# Patient Record
Sex: FEMALE | Race: WHITE | NOT HISPANIC OR LATINO | Employment: OTHER | URBAN - METROPOLITAN AREA
[De-identification: names, ages, dates, MRNs, and addresses within clinical notes are randomized per-mention and may not be internally consistent; named-entity substitution may affect disease eponyms.]

---

## 2017-09-02 ENCOUNTER — RADIANT APPOINTMENT (OUTPATIENT)
Dept: MAMMOGRAPHY | Facility: CLINIC | Age: 52
End: 2017-09-02
Payer: COMMERCIAL

## 2017-09-02 DIAGNOSIS — Z12.31 VISIT FOR SCREENING MAMMOGRAM: ICD-10-CM

## 2017-09-02 PROCEDURE — G0202 SCR MAMMO BI INCL CAD: HCPCS | Mod: TC

## 2017-09-14 ENCOUNTER — RADIANT APPOINTMENT (OUTPATIENT)
Dept: MAMMOGRAPHY | Facility: CLINIC | Age: 52
End: 2017-09-14
Attending: FAMILY MEDICINE
Payer: COMMERCIAL

## 2017-09-14 ENCOUNTER — RADIANT APPOINTMENT (OUTPATIENT)
Dept: ULTRASOUND IMAGING | Facility: CLINIC | Age: 52
End: 2017-09-14
Attending: FAMILY MEDICINE
Payer: COMMERCIAL

## 2017-09-14 DIAGNOSIS — R92.8 ABNORMAL MAMMOGRAM: ICD-10-CM

## 2017-09-14 PROCEDURE — 76642 ULTRASOUND BREAST LIMITED: CPT | Mod: LT | Performed by: STUDENT IN AN ORGANIZED HEALTH CARE EDUCATION/TRAINING PROGRAM

## 2017-09-14 PROCEDURE — G0206 DX MAMMO INCL CAD UNI: HCPCS | Mod: LT | Performed by: STUDENT IN AN ORGANIZED HEALTH CARE EDUCATION/TRAINING PROGRAM

## 2017-09-18 ENCOUNTER — OFFICE VISIT (OUTPATIENT)
Dept: FAMILY MEDICINE | Facility: CLINIC | Age: 52
End: 2017-09-18
Payer: COMMERCIAL

## 2017-09-18 VITALS
HEIGHT: 62 IN | BODY MASS INDEX: 28.34 KG/M2 | HEART RATE: 76 BPM | OXYGEN SATURATION: 99 % | TEMPERATURE: 97.8 F | WEIGHT: 154 LBS | DIASTOLIC BLOOD PRESSURE: 78 MMHG | SYSTOLIC BLOOD PRESSURE: 124 MMHG

## 2017-09-18 DIAGNOSIS — Z12.11 SCREEN FOR COLON CANCER: ICD-10-CM

## 2017-09-18 DIAGNOSIS — M19.041 PRIMARY OSTEOARTHRITIS OF BOTH HANDS: ICD-10-CM

## 2017-09-18 DIAGNOSIS — Z72.0 TOBACCO ABUSE: ICD-10-CM

## 2017-09-18 DIAGNOSIS — Z23 NEED FOR PROPHYLACTIC VACCINATION AND INOCULATION AGAINST INFLUENZA: ICD-10-CM

## 2017-09-18 DIAGNOSIS — N95.1 MENOPAUSAL SYNDROME (HOT FLASHES): ICD-10-CM

## 2017-09-18 DIAGNOSIS — B00.1 HERPES SIMPLEX LABIALIS: ICD-10-CM

## 2017-09-18 DIAGNOSIS — M19.042 PRIMARY OSTEOARTHRITIS OF BOTH HANDS: ICD-10-CM

## 2017-09-18 DIAGNOSIS — Z00.00 ROUTINE GENERAL MEDICAL EXAMINATION AT A HEALTH CARE FACILITY: Primary | ICD-10-CM

## 2017-09-18 LAB
CHOLEST SERPL-MCNC: 282 MG/DL
GLUCOSE SERPL-MCNC: 97 MG/DL (ref 70–99)
HDLC SERPL-MCNC: 37 MG/DL
LDLC SERPL CALC-MCNC: ABNORMAL MG/DL
LDLC SERPL DIRECT ASSAY-MCNC: 181 MG/DL
NONHDLC SERPL-MCNC: 245 MG/DL
TRIGL SERPL-MCNC: 446 MG/DL

## 2017-09-18 PROCEDURE — 83721 ASSAY OF BLOOD LIPOPROTEIN: CPT | Performed by: FAMILY MEDICINE

## 2017-09-18 PROCEDURE — 90471 IMMUNIZATION ADMIN: CPT | Performed by: FAMILY MEDICINE

## 2017-09-18 PROCEDURE — 36415 COLL VENOUS BLD VENIPUNCTURE: CPT | Performed by: FAMILY MEDICINE

## 2017-09-18 PROCEDURE — 80061 LIPID PANEL: CPT | Performed by: FAMILY MEDICINE

## 2017-09-18 PROCEDURE — 82947 ASSAY GLUCOSE BLOOD QUANT: CPT | Performed by: FAMILY MEDICINE

## 2017-09-18 PROCEDURE — 90686 IIV4 VACC NO PRSV 0.5 ML IM: CPT | Performed by: FAMILY MEDICINE

## 2017-09-18 PROCEDURE — 87624 HPV HI-RISK TYP POOLED RSLT: CPT | Performed by: FAMILY MEDICINE

## 2017-09-18 PROCEDURE — G0145 SCR C/V CYTO,THINLAYER,RESCR: HCPCS | Performed by: FAMILY MEDICINE

## 2017-09-18 PROCEDURE — 99386 PREV VISIT NEW AGE 40-64: CPT | Mod: 25 | Performed by: FAMILY MEDICINE

## 2017-09-18 RX ORDER — VALACYCLOVIR HYDROCHLORIDE 1 G/1
2000 TABLET, FILM COATED ORAL 2 TIMES DAILY
Qty: 8 TABLET | Refills: 3 | Status: SHIPPED | OUTPATIENT
Start: 2017-09-18 | End: 2018-11-19

## 2017-09-18 NOTE — MR AVS SNAPSHOT
After Visit Summary   9/18/2017    Angela Mendoza    MRN: [id number]           Patient Information     Date Of Birth          1965        Visit Information        Provider Department      9/18/2017 7:40 AM Cathie Smith MD [facility]        Today's Diagnoses     Routine general medical examination at a health care facility    -  1    Herpes simplex labialis        Tobacco abuse        Menopausal syndrome (hot flashes)        Primary osteoarthritis of both hands          Care Instructions      Options for menopausal symptom control:  Over-the-counter options include black cohosh, evening primrose or estroven  Prescription options include serotonin re-uptake inhibitor medications such as citalopram or zoloft, also clonidine (blood pressure medication) can help.  Hormone replacement (estrogen and progesterone).      Astroglide lubricant    Preventive Health Recommendations  Female Ages 50 - 64    Yearly exam: See your health care provider every year in order to  o Review health changes.   o Discuss preventive care.    o Review your medicines if your doctor has prescribed any.      Get a Pap test every three years (unless you have an abnormal result and your provider advises testing more often).    If you get Pap tests with HPV test, you only need to test every 5 years, unless you have an abnormal result.     You do not need a Pap test if your uterus was removed (hysterectomy) and you have not had cancer.    You should be tested each year for STDs (sexually transmitted diseases) if you're at risk.     Have a mammogram every 1 to 2 years.    Have a colonoscopy at age 50, or have a yearly FIT test (stool test). These exams screen for colon cancer.      Have a cholesterol test every 5 years, or more often if advised.    Have a diabetes test (fasting glucose) every three years. If you are at risk for diabetes, you should have this test more often.     If you are at risk for  osteoporosis (brittle bone disease), think about having a bone density scan (DEXA).    Shots: Get a flu shot each year. Get a tetanus shot every 10 years.    Nutrition:     Eat at least 5 servings of fruits and vegetables each day.    Eat whole-grain bread, whole-wheat pasta and brown rice instead of white grains and rice.    Talk to your provider about Calcium and Vitamin D.     Lifestyle    Exercise at least 150 minutes a week (30 minutes a day, 5 days a week). This will help you control your weight and prevent disease.    Limit alcohol to one drink per day.    No smoking.     Wear sunscreen to prevent skin cancer.     See your dentist every six months for an exam and cleaning.    See your eye doctor every 1 to 2 years.    Preventive Health Recommendations  Female Ages 50 - 64    Yearly exam: See your health care provider every year in order to  o Review health changes.   o Discuss preventive care.    o Review your medicines if your doctor has prescribed any.      Get a Pap test every three years (unless you have an abnormal result and your provider advises testing more often).    If you get Pap tests with HPV test, you only need to test every 5 years, unless you have an abnormal result.     You do not need a Pap test if your uterus was removed (hysterectomy) and you have not had cancer.    You should be tested each year for STDs (sexually transmitted diseases) if you're at risk.     Have a mammogram every 1 to 2 years.    Have a colonoscopy at age 50, or have a yearly FIT test (stool test). These exams screen for colon cancer.      Have a cholesterol test every 5 years, or more often if advised.    Have a diabetes test (fasting glucose) every three years. If you are at risk for diabetes, you should have this test more often.     If you are at risk for osteoporosis (brittle bone disease), think about having a bone density scan (DEXA).    Shots: Get a flu shot each year. Get a tetanus shot every 10  years.    Nutrition:     Eat at least 5 servings of fruits and vegetables each day.    Eat whole-grain bread, whole-wheat pasta and brown rice instead of white grains and rice.    Talk to your provider about Calcium and Vitamin D.     Lifestyle    Exercise at least 150 minutes a week (30 minutes a day, 5 days a week). This will help you control your weight and prevent disease.    Limit alcohol to one drink per day.    No smoking.     Wear sunscreen to prevent skin cancer.     See your dentist every six months for an exam and cleaning.    See your eye doctor every 1 to 2 years.            Follow-ups after your visit        Who to contact     If you have questions or need follow up information about today's clinic visit or your schedule please contact [facility] directly at [phone].  Normal or non-critical lab and imaging results will be communicated to you by MyChart, letter or phone within 4 business days after the clinic has received the results. If you do not hear from us within 7 days, please contact the clinic through [employer] or phone. If you have a critical or abnormal lab result, we will notify you by phone as soon as possible.  Submit refill requests through [employer] or call your pharmacy and they will forward the refill request to us. Please allow 3 business days for your refill to be completed.          Additional Information About Your Visit        [employer] Information     [employer] gives you secure access to your electronic health record. If you see a primary care provider, you can also send messages to your care team and make appointments. If you have questions, please call your primary care clinic.  If you do not have a primary care provider, please call [phone] and they will assist you.        Care EveryWhere ID     This is your Care EveryWhere ID. This could be used by other organizations to access your [employer] medical records  [id number]        Your Vitals Were     Pulse  "Temperature Height Last Period Pulse Oximetry BMI (Body Mass Index)    76 97.8  F (36.6  C) (Oral) 5' 2\" (1.575 m) 01/16/2016 99% 28.17 kg/m2       Blood Pressure from Last 3 Encounters:   09/18/17 124/78   04/30/13 124/82   05/30/12 116/75    Weight from Last 3 Encounters:   09/18/17 154 lb (69.9 kg)   04/30/13 152 lb (68.9 kg)   05/23/12 144 lb (65.3 kg)              We Performed the Following     HPV High Risk Types DNA Cervical     Pap imaged thin layer screen with HPV - recommended age 30 - 65 years (select HPV order below)          Today's Medication Changes          These changes are accurate as of: 9/18/17  8:22 AM.  If you have any questions, ask your nurse or doctor.               Stop taking these medicines if you haven't already. Please contact your care team if you have questions.     fenofibrate 145 MG tablet   Stopped by:  Cathie Smith MD           MULTIVITAMIN PO   Stopped by:  Cathie Smith MD           NO ACTIVE MEDICATIONS   Stopped by:  Cathie Smith MD           NONFORMULARY   Stopped by:  Cathie Smith MD                    Primary Care Provider Office Phone # Fax #    [facility] [phone] [address] [facility]. [facility] [address]        Equal Access to Services     ZANDER NEGRETE AH: Hadii kodi ku hadasho Soomaali, waaxda luqadaha, qaybta kaalmada adeegyada, hannah posey. So [facility] [phone].    ATENCIÓN: Si habla español, tiene a alfaro disposición servicios gratuitos de asistencia lingüística. Cyrus al [phone].    We comply with applicable federal civil rights laws and [place] laws. We do not discriminate on the basis of race, color, national origin, age, disability sex, sexual orientation or gender identity.            Thank you!     Thank you for choosing [facility]  for your care. Our goal is always to provide you with excellent care. Hearing back from our patients is one way we can " continue to improve our services. Please take a few minutes to complete the written survey that you may receive in the mail after your visit with us. Thank you!             Your Updated Medication List - Protect others around you: Learn how to safely use, store and throw away your medicines at www.disposemymeds.org.          This list is accurate as of: 9/18/17  8:22 AM.  Always use your most recent med list.                   Brand Name Dispense Instructions for use Diagnosis    valACYclovir 1000 mg tablet    VALTREX    8 tablet    Take 2 tablets by mouth 2 times daily. 2 grams bid for 1 day    Herpes simplex labialis

## 2017-09-18 NOTE — PROGRESS NOTES
Injectable Influenza Immunization Documentation    1.  Is the person to be vaccinated sick today? NO    2. Does the person to be vaccinated have an allergy to a component   of the vaccine? NO    3. Has the person to be vaccinated ever had a serious reaction   to influenza vaccine in the past? NO  4. Has the person to be vaccinated ever had Guillain-Barré syndrome? No    Form completed by [facility], [facility]

## 2017-09-18 NOTE — LETTER
[facility]  [address] [facility] [address] [phone]        October 9, 2017    Bela Lovell  [address] [address]  [place] [address]            Dear Bela,    We should meet to review these labs within the next month.  Your fats are very high and that can cause other health problems.  Please be sure to watch the fatty fried foods and the butters/oils.     Ijeoma Mcgowan MD/khloe    Results for orders placed or performed in visit on 09/18/17   Pap imaged thin layer screen with HPV - recommended age 30 - 65 years (select HPV order below)   Result Value Ref Range    PAP NIL     Copath Report         Patient Name: BELA LOVELL  MR#: [id number]  Specimen #: [id number]  Collected: 9/18/2017  Received: 9/19/2017  Reported: 9/21/2017 09:07  Ordering Phy(s): IJEOMA MCGOWAN    For improved result formatting, select 'View Enhanced Report Format'  under Linked Documents section.    SPECIMEN/STAIN PROCESS:  Pap imaged thin layer prep screening (Surepath, FocalPoint with guided  screening)       Pap-Cyto x 1, HPV ordered x 1    SOURCE: Cervical, endocervical  ----------------------------------------------------------------   Pap imaged thin layer prep screening (Surepath, FocalPoint with guided  screening)  SPECIMEN ADEQUACY:  Satisfactory for evaluation.  -Transformation zone component present.    CYTOLOGIC INTERPRETATION:    Negative for intraepithelial lesion or malignancy    Electronically signed out by:  MAYCOL Ruiz (ASCP)    Processed and screened at [facility],  [facility]    CLINICAL HISTORY:  LMP: 1/16/2016  Post Menopausal,  Previous Other-NIL EM in a woman 45 years of age or  older  Date of Last Pap: 2/18/2011,    Papanicolaou Test Limitations:  Cervical cytology is a screening test  with limited sensitivity; regular screening is critical for cancer  prevention; Pap tests are primarily  effective for the  diagnosis/prevention of squamous cell carcinoma, not adenocarcinomas or  other cancers.    TESTING LAB LOCATION:  [address]  [phone]    COLLECTION SITE:  Client:  [facility] [place]  Location: [facility] (B)     HPV High Risk Types DNA Cervical   Result Value Ref Range    HPV 16 DNA Negative NEG^Negative    HPV 18 DNA Negative NEG^Negative    Other HR HPV Negative NEG^Negative    Final Diagnosis This patient's sample is negative for HPV DNA.     Specimen Description Cervical Cells    Lipid panel reflex to direct LDL   Result Value Ref Range    Cholesterol 282 (H) <200 mg/dL    Triglycerides 446 (H) <150 mg/dL    HDL Cholesterol 37 (L) >49 mg/dL    LDL Cholesterol Calculated  <100 mg/dL     Cannot estimate LDL when triglyceride exceeds 400 mg/dL    Non HDL Cholesterol 245 (H) <130 mg/dL   Glucose   Result Value Ref Range    Glucose 97 70 - 99 mg/dL   LDL cholesterol direct   Result Value Ref Range    LDL Cholesterol Direct 181 (H) <100 mg/dL

## 2017-09-18 NOTE — PROGRESS NOTES
"   SUBJECTIVE:   CC: Angela Mendoza is an 51 year old woman who presents for preventive health visit.     Healthy Habits:    Do you get at least three servings of calcium containing foods daily (dairy, green leafy vegetables, etc.)? yes    Amount of exercise or daily activities, outside of work: *** day(s) per week    Problems taking medications regularly No    Medication side effects: No    Have you had an eye exam in the past two years? yes    Do you see a dentist twice per year? no    Do you have sleep apnea, excessive snoring or daytime drowsiness?no    {Outside tests to abstract? :887658}    {additional problems to add (Optional):205280}    Today's PHQ-2 Score:   PHQ-2 ( 1999 Pfizer) 9/18/2017 4/30/2013   Q1: Little interest or pleasure in doing things 0 0   Q2: Feeling down, depressed or hopeless 0 0   PHQ-2 Score 0 0   Q1: Little interest or pleasure in doing things Not at all -   Q2: Feeling down, depressed or hopeless Not at all -   PHQ-2 Score 0 -     {PHQ-2 LOOK IN ASSESSMENTS (Optional) :291545}  Abuse: Current or Past(Physical, Sexual or Emotional)- {YES/NO/NA:438251}  Do you feel safe in your environment - {YES/NO/NA:458485}    Social History   Substance Use Topics     Smoking status: Current Every Day Smoker     Packs/day: 0.50     Years: 20.00     Types: Cigarettes     Smokeless tobacco: Never Used      Comment: 2 cigarettes daily      Alcohol use No     {ETOH AUDIT:442214}    Reviewed orders with patient.  Reviewed health maintenance and updated orders accordingly - {Yes/No:316195::\"Yes\"}    G {number:10} P {number:10}   No LMP recorded.     Fasting: {YES:866235}   Td: tdap 2010       Last 3 Pap Results:   PAP (no units)   Date Value   02/18/2011 OTHER-NIL EM>40        HPV: not done          Cholesterol:   Lab Results   Component Value Date    CHOL 231 04/30/2013     Lab Results   Component Value Date    HDL 39 04/30/2013     Lab Results   Component Value Date     04/30/2013     Lab Results "   Component Value Date    TRIG 323 2013     Lab Results   Component Value Date    CHOLHDLRATIO 5.9 2013         MM/17  Dexa:  NA     Flex/colo: due      Seat Belt: {YES:574928}   Sunscreen use: {YES:474654}  Calcium Intake: ***   Health Care Directive: {YES:182157}  Sexually Active: {YES:561523}    Current contraception: {contraception:706}  History of abnormal Pap smear: {abnl Pap:60}  Family history of colon/breast/ovarian cancer: No  Regular self breast exam: {SBE:63}  History of abnormal mammogram: {abnl mammogram:60}       Labs reviewed in EPIC  BP Readings from Last 3 Encounters:   13 124/82   12 116/75   12 112/84    Wt Readings from Last 3 Encounters:   13 152 lb (68.9 kg)   12 144 lb (65.3 kg)   05/15/12 144 lb 6.4 oz (65.5 kg)                  Patient Active Problem List   Diagnosis     CARDIOVASCULAR SCREENING; LDL GOAL LESS THAN 160     Menopausal syndrome (hot flashes)     Tobacco abuse     Herpes simplex labialis     Osteoarthritis     Hypertriglyceridemia     Past Surgical History:   Procedure Laterality Date     CHOLECYSTECTOMY           DILATION AND CURETTAGE       EXCISE MASS FINGER  5/15/2012    Procedure:EXCISE MASS FINGER; Left Middle Finger Mucinous Cyst Excision, Rotator Flap Distal Interphalangeal Debridement; Surgeon:MIGUELINA LUGO; Location: OR       Social History   Substance Use Topics     Smoking status: Current Every Day Smoker     Packs/day: 0.50     Years: 20.00     Types: Cigarettes     Smokeless tobacco: Never Used      Comment: 2 cigarettes daily      Alcohol use No     Family History   Problem Relation Age of Onset     Neurologic Disorder Father       of MS      Asthma No family hx of      C.A.D. No family hx of      DIABETES No family hx of      Hypertension No family hx of      CEREBROVASCULAR DISEASE No family hx of      Breast Cancer No family hx of      Cancer - colorectal No family hx of      Prostate Cancer No  "family hx of          Current Outpatient Prescriptions   Medication Sig Dispense Refill     fenofibrate 145 MG tablet Take 1 tablet by mouth daily. 90 tablet 3     valACYclovir (VALTREX) 1000 mg tablet Take 2 tablets by mouth 2 times daily. 2 grams bid for 1 day 8 tablet 3     Multiple Vitamin (MULTIVITAMIN OR) Take  by mouth.       NONFORMULARY        NO ACTIVE MEDICATIONS              Patient over age 50, mutual decision to screen reflected in health maintenance.      Pertinent mammograms are reviewed under the imaging tab.      Reviewed and updated as needed this visit by clinical staff         Reviewed and updated as needed this visit by Provider        {HISTORY OPTIONS (Optional):081473}    ROS:  {FEMALE PREVENTATIVE ROS:727596}    OBJECTIVE:   There were no vitals taken for this visit.  EXAM:  {Exam Choices:318051}    ASSESSMENT/PLAN:   {Diag Picklist:900785}    COUNSELING:   Reviewed preventive health counseling, as reflected in patient instructions  Special attention given to:        Regular exercise       Healthy diet/nutrition    {BP Counseling- Complete if BP >= 120/80  (Optional):393859}     reports that she has been smoking Cigarettes.  She has a 10.00 pack-year smoking history. She has never used smokeless tobacco.  {Tobacco Cessation -- Complete if patient is a smoker (Optional):657009}  Estimated body mass index is 26.93 kg/(m^2) as calculated from the following:    Height as of 4/30/13: 5' 3\" (1.6 m).    Weight as of 4/30/13: 152 lb (68.9 kg).   {Weight Management Plan (ACO) Complete if BMI is abnormal-  Ages 18-64  BMI >24.9.  Age 65+ with BMI <23 or >30 (Optional):074395}    Counseling Resources:  ATP IV Guidelines  Pooled Cohorts Equation Calculator  Breast Cancer Risk Calculator  FRAX Risk Assessment  ICSI Preventive Guidelines  Dietary Guidelines for Americans, 2010  USDA's MyPlate  ASA Prophylaxis  Lung CA Screening    Cathie Smith MD  [facility]  "

## 2017-09-18 NOTE — LETTER
September 28, 2017    Angela CURTIS [employer] [address] [address]  [place] [address]    Dear Angela,  We are happy to inform you that your PAP smear result from 9/18/17 is normal.  We are now able to do a follow up test on PAP smears. The DNA test is for HPV (Human Papilloma Virus). Cervical cancer is closely linked with certain types of HPV. Your result showed no evidence of high risk HPV.  Therefore we recommend you return in 3 years for your next pap smear.  You will still need to return to the clinic every year for an annual exam and other preventive tests.  Please contact the clinic at [phone] with any questions.  Sincerely,    Cathie Smith MD/kindra

## 2017-09-18 NOTE — NURSING NOTE
"Chief Complaint   Patient presents with     Physical       Initial /78  Pulse 76  Temp 97.8  F (36.6  C) (Oral)  Ht 5' 2\" (1.575 m)  Wt 154 lb (69.9 kg)  LMP 01/16/2016  SpO2 99%  BMI 28.17 kg/m2 Estimated body mass index is 28.17 kg/(m^2) as calculated from the following:    Height as of this encounter: 5' 2\" (1.575 m).    Weight as of this encounter: 154 lb (69.9 kg).  Medication Reconciliation: complete     Marie Estrada cma      "

## 2017-09-18 NOTE — PROGRESS NOTES
"   SUBJECTIVE:   CC: Angela Mendoza is an 51 year old woman who presents for preventive health visit.     Healthy Habits:    Do you get at least three servings of calcium containing foods daily (dairy, green leafy vegetables, etc.)? {YES/NO, DAIRY INTAKE:186927::\"yes\"}    Amount of exercise or daily activities, outside of work: {AMOUNT EXERCISE:064803}    Problems taking medications regularly {Yes /No default:824496::\"No\"}    Medication side effects: {Yes /No default.:324516::\"No\"}    Have you had an eye exam in the past two years? {YESNOBLANK:197330}    Do you see a dentist twice per year? {YESNOBLANK:115968}    Do you have sleep apnea, excessive snoring or daytime drowsiness?{YESNOBLANK:725917}    {Outside tests to abstract? :809806}    {additional problems to add (Optional):566970}    Today's PHQ-2 Score:   PHQ-2 ( 1999 Pfizer) 4/30/2013 5/4/2012   Q1: Little interest or pleasure in doing things 0 0   Q2: Feeling down, depressed or hopeless 0 0   PHQ-2 Score 0 0     {PHQ-2 LOOK IN ASSESSMENTS (Optional) :503590}  Abuse: Current or Past(Physical, Sexual or Emotional)- {YES/NO/NA:945824}  Do you feel safe in your environment - {YES/NO/NA:864879}    Social History   Substance Use Topics     Smoking status: Current Every Day Smoker     Packs/day: 0.50     Years: 20.00     Types: Cigarettes     Smokeless tobacco: Never Used      Comment: 2 cigarettes daily      Alcohol use No     {ETOH AUDIT:888728}    Reviewed orders with patient.  Reviewed health maintenance and updated orders accordingly - {Yes/No:394955::\"Yes\"}  {Chronicprobdata (Optional):935198}    {Mammo Decision Support (Optional):804275}    Pertinent mammograms are reviewed under the imaging tab.  History of abnormal Pap smear: {PAP HX:802346}    Reviewed and updated as needed this visit by clinical staff         Reviewed and updated as needed this visit by Provider        {HISTORY OPTIONS (Optional):447494}    ROS:  {FEMALE PREVENTATIVE " "ROS:854896}    OBJECTIVE:   There were no vitals taken for this visit.  EXAM:  {Exam Choices:696822}    ASSESSMENT/PLAN:   {Diag Picklist:712272}    COUNSELING:   {FEMALE COUNSELING MESSAGES:390376::\"Reviewed preventive health counseling, as reflected in patient instructions\"}    {BP Counseling- Complete if BP >= 120/80  (Optional):744964}     reports that she has been smoking Cigarettes.  She has a 10.00 pack-year smoking history. She has never used smokeless tobacco.  {Tobacco Cessation -- Complete if patient is a smoker (Optional):151040}  Estimated body mass index is 26.93 kg/(m^2) as calculated from the following:    Height as of 4/30/13: 5' 3\" (1.6 m).    Weight as of 4/30/13: 152 lb (68.9 kg).   {Weight Management Plan (ACO) Complete if BMI is abnormal-  Ages 18-64  BMI >24.9.  Age 65+ with BMI <23 or >30 (Optional):396689}    Counseling Resources:  ATP IV Guidelines  Pooled Cohorts Equation Calculator  Breast Cancer Risk Calculator  FRAX Risk Assessment  ICSI Preventive Guidelines  Dietary Guidelines for Americans, 2010  USDA's MyPlate  ASA Prophylaxis  Lung CA Screening    Cathie Smith MD  [facility]  "

## 2017-09-18 NOTE — PATIENT INSTRUCTIONS
Options for menopausal symptom control:  Over-the-counter options include black cohosh, evening primrose or estroven  Prescription options include serotonin re-uptake inhibitor medications such as citalopram or zoloft, also clonidine (blood pressure medication) can help.  Hormone replacement (estrogen and progesterone).      Astroglide lubricant    Preventive Health Recommendations  Female Ages 50 - 64    Yearly exam: See your health care provider every year in order to  o Review health changes.   o Discuss preventive care.    o Review your medicines if your doctor has prescribed any.      Get a Pap test every three years (unless you have an abnormal result and your provider advises testing more often).    If you get Pap tests with HPV test, you only need to test every 5 years, unless you have an abnormal result.     You do not need a Pap test if your uterus was removed (hysterectomy) and you have not had cancer.    You should be tested each year for STDs (sexually transmitted diseases) if you're at risk.     Have a mammogram every 1 to 2 years.    Have a colonoscopy at age 50, or have a yearly FIT test (stool test). These exams screen for colon cancer.      Have a cholesterol test every 5 years, or more often if advised.    Have a diabetes test (fasting glucose) every three years. If you are at risk for diabetes, you should have this test more often.     If you are at risk for osteoporosis (brittle bone disease), think about having a bone density scan (DEXA).    Shots: Get a flu shot each year. Get a tetanus shot every 10 years.    Nutrition:     Eat at least 5 servings of fruits and vegetables each day.    Eat whole-grain bread, whole-wheat pasta and brown rice instead of white grains and rice.    Talk to your provider about Calcium and Vitamin D.     Lifestyle    Exercise at least 150 minutes a week (30 minutes a day, 5 days a week). This will help you control your weight and prevent disease.    Limit alcohol  to one drink per day.    No smoking.     Wear sunscreen to prevent skin cancer.     See your dentist every six months for an exam and cleaning.    See your eye doctor every 1 to 2 years.    Preventive Health Recommendations  Female Ages 50 - 64    Yearly exam: See your health care provider every year in order to  o Review health changes.   o Discuss preventive care.    o Review your medicines if your doctor has prescribed any.      Get a Pap test every three years (unless you have an abnormal result and your provider advises testing more often).    If you get Pap tests with HPV test, you only need to test every 5 years, unless you have an abnormal result.     You do not need a Pap test if your uterus was removed (hysterectomy) and you have not had cancer.    You should be tested each year for STDs (sexually transmitted diseases) if you're at risk.     Have a mammogram every 1 to 2 years.    Have a colonoscopy at age 50, or have a yearly FIT test (stool test). These exams screen for colon cancer.      Have a cholesterol test every 5 years, or more often if advised.    Have a diabetes test (fasting glucose) every three years. If you are at risk for diabetes, you should have this test more often.     If you are at risk for osteoporosis (brittle bone disease), think about having a bone density scan (DEXA).    Shots: Get a flu shot each year. Get a tetanus shot every 10 years.    Nutrition:     Eat at least 5 servings of fruits and vegetables each day.    Eat whole-grain bread, whole-wheat pasta and brown rice instead of white grains and rice.    Talk to your provider about Calcium and Vitamin D.     Lifestyle    Exercise at least 150 minutes a week (30 minutes a day, 5 days a week). This will help you control your weight and prevent disease.    Limit alcohol to one drink per day.    No smoking.     Wear sunscreen to prevent skin cancer.     See your dentist every six months for an exam and cleaning.    See your eye  doctor every 1 to 2 years.

## 2017-09-18 NOTE — PROGRESS NOTES
SUBJECTIVE:   CC: Angela Mendoza is an 51 year old woman who presents for preventive health visit.     Physical   Annual:     Getting at least 3 servings of Calcium per day::  Yes    Bi-annual eye exam::  Yes    Dental care twice a year::  NO    Sleep apnea or symptoms of sleep apnea::  None    Diet::  Regular (no restrictions)    Taking medications regularly::  Yes    Medication side effects::  None    Additional concerns today::  YES            Today's PHQ-2 Score:   PHQ-2 (  [employer]) 2017   Q1: Little interest or pleasure in doing things 0   Q2: Feeling down, depressed or hopeless 0   PHQ-2 Score 0   Q1: Little interest or pleasure in doing things Not at all   Q2: Feeling down, depressed or hopeless Not at all   PHQ-2 Score 0       Abuse: Current or Past(Physical, Sexual or Emotional)- No  Do you feel safe in your environment - Yes    Social History   Substance Use Topics     Smoking status: Current Every Day Smoker     Packs/day: 0.50     Years: 20.00     Types: Cigarettes     Smokeless tobacco: Never Used      Comment: 2 cigarettes daily      Alcohol use No     The patient does not drink >3 drinks per day nor >7 drinks per week.    Reviewed orders with patient.  Reviewed health maintenance and updated orders accordingly - Yes    G 0 P 0   Patient's last menstrual period was 2016.     Fasting: Yes    Td: tdap        Last 3 Pap Results:   PAP (no units)   Date Value   2011 OTHER-NIL EM>40        HPV: not done          Cholesterol:   Lab Results   Component Value Date    CHOL 231 2013     Lab Results   Component Value Date    HDL 39 2013     Lab Results   Component Value Date     2013     Lab Results   Component Value Date    TRIG 323 2013     Lab Results   Component Value Date    CHOLHDLRATIO 5.9 2013         MM/17  Dexa:  NA     Flex/colo: due    Seat Belt: Yes    Sunscreen use: Yes   Calcium Intake: adeq  Health Care Directive: No  Sexually  Active: Yes     Current contraception: none  History of abnormal Pap smear: No  Family history of colon/breast/ovarian cancer: No  Regular self breast exam: Yes  History of abnormal mammogram: Yes: see reports      Labs reviewed in EPIC  BP Readings from Last 3 Encounters:   17 124/78   13 124/82   12 116/75    Wt Readings from Last 3 Encounters:   17 154 lb (69.9 kg)   13 152 lb (68.9 kg)   12 144 lb (65.3 kg)                  Patient Active Problem List   Diagnosis     CARDIOVASCULAR SCREENING; LDL GOAL LESS THAN 160     Menopausal syndrome (hot flashes)     Tobacco abuse     Herpes simplex labialis     Osteoarthritis     Hypertriglyceridemia     Past Surgical History:   Procedure Laterality Date     CHOLECYSTECTOMY           DILATION AND CURETTAGE       EXCISE MASS FINGER  5/15/2012    Procedure:EXCISE MASS FINGER; Left Middle Finger Mucinous Cyst Excision, Rotator Flap Distal Interphalangeal Debridement; Surgeon:MIGUELINA LUGO; Location:[facility]       Social History   Substance Use Topics     Smoking status: Current Every Day Smoker     Packs/day: 0.50     Years: 20.00     Types: Cigarettes     Smokeless tobacco: Never Used      Comment: 2 cigarettes daily      Alcohol use No     Family History   Problem Relation Age of Onset     Neurologic Disorder Father       of MS      Asthma No family hx of      C.A.D. No family hx of      DIABETES No family hx of      Hypertension No family hx of      CEREBROVASCULAR DISEASE No family hx of      Breast Cancer No family hx of      Cancer - colorectal No family hx of      Prostate Cancer No family hx of          Current Outpatient Prescriptions   Medication Sig Dispense Refill     valACYclovir (VALTREX) 1000 mg tablet Take 2 tablets by mouth 2 times daily. 2 grams bid for 1 day (Patient not taking: Reported on 2017) 8 tablet 3           Patient over age 50, mutual decision to screen reflected in health  "maintenance.      Pertinent mammograms are reviewed under the imaging tab.      Reviewed and updated as needed this visit by clinical staff  Tobacco  Allergies  Meds  Med Hx  Surg Hx  Fam Hx  Soc Hx        Reviewed and updated as needed this visit by Provider            ROS:  C: NEGATIVE for fever, chills, change in weight  I: NEGATIVE for worrisome rashes, moles or lesions  E: NEGATIVE for vision changes or irritation  ENT: NEGATIVE for ear, mouth and throat problems  R: NEGATIVE for significant cough or SOB  B: NEGATIVE for masses, tenderness or discharge  CV: NEGATIVE for chest pain, palpitations or peripheral edema  GI: NEGATIVE for nausea, abdominal pain, heartburn, or change in bowel habits  : NEGATIVE for unusual urinary or vaginal symptoms. No vaginal bleeding.  M: NEGATIVE for significant arthralgias or myalgia  N: NEGATIVE for weakness, dizziness or paresthesias  P: NEGATIVE for changes in mood or affect      OBJECTIVE:   /78  Pulse 76  Temp 97.8  F (36.6  C) (Oral)  Ht 5' 2\" (1.575 m)  Wt 154 lb (69.9 kg)  LMP 01/16/2016  SpO2 99%  BMI 28.17 kg/m2  EXAM:  GENERAL APPEARANCE: healthy, alert and no distress  EYES: Eyes grossly normal to inspection, PERRL and conjunctivae and sclerae normal  HENT: ear canals and TM's normal, nose and mouth without ulcers or lesions, oropharynx clear and oral mucous membranes moist  NECK: no adenopathy, no asymmetry, masses, or scars and thyroid normal to palpation  RESP: lungs clear to auscultation - no rales, rhonchi or wheezes  BREAST: normal without masses, tenderness or nipple discharge and no palpable axillary masses or adenopathy  CV: regular rate and rhythm, normal S1 S2, no S3 or S4, no murmur, click or rub, no peripheral edema and peripheral pulses strong  ABDOMEN: soft, nontender, no hepatosplenomegaly, no masses and bowel sounds normal   (female): normal female external genitalia, normal urethral meatus, vaginal mucosal atrophy noted, normal " cervix, adnexae, and uterus without masses or abnormal discharge  MS: no musculoskeletal defects are noted and gait is age appropriate without ataxia  SKIN: no suspicious lesions or rashes  NEURO: Normal strength and tone, sensory exam grossly normal, mentation intact and speech normal  PSYCH: mentation appears normal and affect normal/bright    ASSESSMENT/PLAN:   (Z00.00) Routine general medical examination at a health care facility  (primary encounter diagnosis)  Comment: preventive needs reviewed  Plan: Pap imaged thin layer screen with HPV -         recommended age 30 - 65 years (select HPV order        below), HPV High Risk Types DNA Cervical, Lipid        panel reflex to direct LDL, Glucose        see orders in Epic.     (B00.1) Herpes simplex labialis  Comment: 2-3 outbreaks per year  Plan: valACYclovir (VALTREX) 1000 mg tablet        Refill x 1 yr     (Z72.0) Tobacco abuse  Comment: not ready to quit yet but starting to plan  Plan: will notify me if needs patches or meds    (N95.1) Menopausal syndrome (hot flashes)  Comment: bothersome, estroven helps some  Plan: reviewed options, pt will be trying OTC meds    (M19.041,  M19.042) Primary osteoarthritis of both hands  Comment: evaluated in past by rheum, inflammatory arthritis ruled out  Plan: reviewed options of heat, PT and tylenol    (Z12.11) Screen for colon cancer  Comment: due  Plan: GASTROENTEROLOGY ADULT REF PROCEDURE ONLY            (Z23) Need for prophylactic vaccination and inoculation against influenza  Comment: due  Plan: FLU VAC, SPLIT VIRUS IM > 3 YO (QUADRIVALENT)         [60815], Vaccine Administration, Initial         [31087]              COUNSELING:  Reviewed preventive health counseling, as reflected in patient instructions  Special attention given to:        Regular exercise       Healthy diet/nutrition    BP Screening:   Last 3 BP Readings:    BP Readings from Last 3 Encounters:   09/18/17 124/78   04/30/13 124/82   05/30/12 116/75  "      The following was recommended to the patient:  Re-screen BP within a year and recommended lifestyle modifications     reports that she has been smoking Cigarettes.  She has a 10.00 pack-year smoking history. She has never used smokeless tobacco.  Tobacco Cessation Action Plan: Information offered: Patient not interested at this time  Estimated body mass index is 28.17 kg/(m^2) as calculated from the following:    Height as of this encounter: 5' 2\" (1.575 m).    Weight as of this encounter: 154 lb (69.9 kg).   Weight management plan: Discussed healthy diet and exercise guidelines and patient will follow up in 12 months in clinic to re-evaluate.    Counseling Resources:  ATP IV Guidelines  Pooled Cohorts Equation Calculator  Breast Cancer Risk Calculator  FRAX Risk Assessment  ICSI Preventive Guidelines  Dietary Guidelines for Americans, 2010  USDA's MyPlate  ASA Prophylaxis  Lung CA Screening    Cathie Smith MD  [facility]  Answers for HPI/ROS submitted by the patient on 9/18/2017   PHQ-2 Score: 0  "

## 2017-09-21 LAB
COPATH REPORT: NORMAL
PAP: NORMAL

## 2017-09-25 LAB
FINAL DIAGNOSIS: NORMAL
HPV HR 12 DNA CVX QL NAA+PROBE: NEGATIVE
HPV16 DNA SPEC QL NAA+PROBE: NEGATIVE
HPV18 DNA SPEC QL NAA+PROBE: NEGATIVE
SPECIMEN DESCRIPTION: NORMAL

## 2017-11-08 ENCOUNTER — SURGERY (OUTPATIENT)
Age: 52
End: 2017-11-08

## 2017-11-08 ENCOUNTER — HOSPITAL ENCOUNTER (OUTPATIENT)
Facility: AMBULATORY SURGERY CENTER | Age: 52
Discharge: HOME | End: 2017-11-08
Attending: SURGERY | Admitting: SURGERY
Payer: COMMERCIAL

## 2017-11-08 VITALS
RESPIRATION RATE: 16 BRPM | BODY MASS INDEX: 28.34 KG/M2 | TEMPERATURE: 98.1 F | HEIGHT: 62 IN | OXYGEN SATURATION: 96 % | SYSTOLIC BLOOD PRESSURE: 111 MMHG | WEIGHT: 154 LBS | DIASTOLIC BLOOD PRESSURE: 90 MMHG

## 2017-11-08 PROBLEM — D12.6 BENIGN NEOPLASM OF COLON: Status: ACTIVE | Noted: 2017-11-08

## 2017-11-08 PROCEDURE — 45385 COLONOSCOPY W/LESION REMOVAL: CPT

## 2017-11-08 PROCEDURE — G8907 PT DOC NO EVENTS ON DISCHARG: HCPCS

## 2017-11-08 PROCEDURE — 45385 COLONOSCOPY W/LESION REMOVAL: CPT | Mod: PT | Performed by: SURGERY

## 2017-11-08 PROCEDURE — 88305 TISSUE EXAM BY PATHOLOGIST: CPT | Performed by: SURGERY

## 2017-11-08 PROCEDURE — G8918 PT W/O PREOP ORDER IV AB PRO: HCPCS

## 2017-11-08 PROCEDURE — 99152 MOD SED SAME PHYS/QHP 5/>YRS: CPT | Performed by: SURGERY

## 2017-11-08 PROCEDURE — 99153 MOD SED SAME PHYS/QHP EA: CPT | Performed by: SURGERY

## 2017-11-08 RX ORDER — FENTANYL CITRATE 50 UG/ML
INJECTION, SOLUTION INTRAMUSCULAR; INTRAVENOUS PRN
Status: DISCONTINUED | OUTPATIENT
Start: 2017-11-08 | End: 2017-11-08 | Stop reason: HOSPADM

## 2017-11-08 RX ORDER — LIDOCAINE 40 MG/G
CREAM TOPICAL
Status: DISCONTINUED | OUTPATIENT
Start: 2017-11-08 | End: 2017-11-09 | Stop reason: HOSPADM

## 2017-11-08 RX ORDER — ONDANSETRON 2 MG/ML
4 INJECTION INTRAMUSCULAR; INTRAVENOUS
Status: DISCONTINUED | OUTPATIENT
Start: 2017-11-08 | End: 2017-11-09 | Stop reason: HOSPADM

## 2017-11-08 RX ADMIN — FENTANYL CITRATE 100 MCG: 50 INJECTION, SOLUTION INTRAMUSCULAR; INTRAVENOUS at 07:38

## 2017-11-08 RX ADMIN — FENTANYL CITRATE 50 MCG: 50 INJECTION, SOLUTION INTRAMUSCULAR; INTRAVENOUS at 08:10

## 2017-11-09 LAB — COPATH REPORT: NORMAL

## 2017-11-14 LAB — COLONOSCOPY: NORMAL

## 2017-11-17 ENCOUNTER — TELEPHONE (OUTPATIENT)
Dept: FAMILY MEDICINE | Facility: CLINIC | Age: 52
End: 2017-11-17

## 2017-11-17 NOTE — TELEPHONE ENCOUNTER
Panel Management Review          Composite cancer screening  Chart review shows that this patient is due/due soon for the following None  Summary:    Patient is due/failing the following:   none    Action needed:   none    Type of outreachnone. colonscopy completed 11/8/2017    Questions for provider review:    None                                                                                                                                    Coty Reyes CMA       Chart routed to close .

## 2017-11-20 ENCOUNTER — OFFICE VISIT (OUTPATIENT)
Dept: FAMILY MEDICINE | Facility: CLINIC | Age: 52
End: 2017-11-20
Payer: COMMERCIAL

## 2017-11-20 VITALS
BODY MASS INDEX: 28.42 KG/M2 | DIASTOLIC BLOOD PRESSURE: 81 MMHG | WEIGHT: 155.4 LBS | HEART RATE: 101 BPM | TEMPERATURE: 98.5 F | SYSTOLIC BLOOD PRESSURE: 119 MMHG

## 2017-11-20 DIAGNOSIS — Z11.9 SCREENING EXAMINATION FOR INFECTIOUS DISEASE: ICD-10-CM

## 2017-11-20 DIAGNOSIS — E78.1 HYPERTRIGLYCERIDEMIA: Primary | ICD-10-CM

## 2017-11-20 PROCEDURE — 99213 OFFICE O/P EST LOW 20 MIN: CPT | Performed by: FAMILY MEDICINE

## 2017-11-20 RX ORDER — FENOFIBRATE 48 MG/1
48 TABLET, COATED ORAL DAILY
Qty: 30 TABLET | Refills: 1 | Status: SHIPPED | OUTPATIENT
Start: 2017-11-20 | End: 2017-12-26

## 2017-11-20 NOTE — NURSING NOTE
"Chief Complaint   Patient presents with     RECHECK       Initial /81  Pulse 101  Temp 98.5  F (36.9  C) (Oral)  Wt 155 lb 6.4 oz (70.5 kg)  LMP 01/16/2016  BMI 28.42 kg/m2 Estimated body mass index is 28.42 kg/(m^2) as calculated from the following:    Height as of 11/1/17: 5' 2\" (1.575 m).    Weight as of this encounter: 155 lb 6.4 oz (70.5 kg).  Medication Reconciliation: complete  Omega Hall CMA    "

## 2017-11-20 NOTE — MR AVS SNAPSHOT
After Visit Summary   11/20/2017    Tika Mendoza    MRN: [id number]           Patient Information     Date Of Birth          1965        Visit Information        Provider Department      11/20/2017 3:20 PM Cathie Smith MD [facility]        Today's Diagnoses     Hypertriglyceridemia    -  1    Screening examination for infectious disease           Follow-ups after your visit        Follow-up notes from your care team     Return in about 6 weeks (around 1/1/2018) for Lab Work fasting.      Future tests that were ordered for you today     Open Future Orders        Priority Expected Expires Ordered    Hepatitis C Screen Reflex to HCV RNA Quant and Genotype Routine 11/20/2017 11/20/2018 11/20/2017    Lipid panel reflex to direct LDL Fasting Routine 11/20/2017 11/20/2018 11/20/2017    Hepatic panel Routine 1/1/2018 1/15/2018 11/20/2017            Who to contact     If you have questions or need follow up information about today's clinic visit or your schedule please contact [facility] directly at [phone].  Normal or non-critical lab and imaging results will be communicated to you by MyChart, letter or phone within 4 business days after the clinic has received the results. If you do not hear from us within 7 days, please contact the clinic through MyChart or phone. If you have a critical or abnormal lab result, we will notify you by phone as soon as possible.  Submit refill requests through [employer] or call your pharmacy and they will forward the refill request to us. Please allow 3 business days for your refill to be completed.          Additional Information About Your Visit        Care EveryWhere ID     This is your Care EveryWhere ID. This could be used by other organizations to access your [place] medical records  [id number]        Your Vitals Were     Pulse Temperature Last Period BMI (Body Mass Index)          101 98.5  F (36.9  C) (Oral) 01/16/2016  28.42 kg/m2         Blood Pressure from Last 3 Encounters:   11/20/17 119/81   11/08/17 111/90   09/18/17 124/78    Weight from Last 3 Encounters:   11/20/17 155 lb 6.4 oz (70.5 kg)   11/01/17 154 lb (69.9 kg)   09/18/17 154 lb (69.9 kg)                 Today's Medication Changes          These changes are accurate as of: 11/20/17  3:46 PM.  If you have any questions, ask your nurse or doctor.               Start taking these medicines.        Dose/Directions    fenofibrate 48 MG tablet   Used for:  Hypertriglyceridemia   Started by:  Cathie Smith MD        Dose:  48 mg   Take 1 tablet (48 mg) by mouth daily   Quantity:  30 tablet   Refills:  1            Where to get your medicines      These medications were sent to [id number] [id number] [place] [address] & [facility]  [address] [facility] [address]    Hours:  24-hours Phone:  [phone]     fenofibrate 48 MG tablet                Primary Care Provider Office Phone # Fax #    Cathie Smith [phone] [address] [facility] [address]        Equal Access to Services     ZANDER NEGRETE AH: Hadii aad ku hadasho Soomaali, waaxda luqadaha, qaybta kaalmada adeegyada, waxay idiin hayaan modesto kharajamin labro posey. So [facility] [phone].    ATENCIÓN: Si habla español, tiene a alfaro disposición servicios gratuitos de asistencia lingüística. ame al [phone].    We comply with applicable federal civil rights laws and [place] laws. We do not discriminate on the basis of race, color, national origin, age, disability, sex, sexual orientation, or gender identity.            Thank you!     Thank you for choosing [facility]  for your care. Our goal is always to provide you with excellent care. Hearing back from our patients is one way we can continue to improve our services. Please take a few minutes to complete the written survey that you may receive in the  mail after your visit with us. Thank you!             Your Updated Medication List - Protect others around you: Learn how to safely use, store and throw away your medicines at www.disposemymeds.org.          This list is accurate as of: 11/20/17  3:46 PM.  Always use your most recent med list.                   Brand Name Dispense Instructions for use Diagnosis    ALLEGRA PO           fenofibrate 48 MG tablet     30 tablet    Take 1 tablet (48 mg) by mouth daily    Hypertriglyceridemia       valACYclovir 1000 mg tablet    VALTREX    8 tablet    Take 2 tablets (2,000 mg) by mouth 2 times daily 2 grams bid for 1 day    Herpes simplex labialis

## 2017-11-20 NOTE — PROGRESS NOTES
SUBJECTIVE:   Tika Mendoza is a 52 year old female who presents to clinic today for the following health issues:      Discuss previous lab work   Elevated triglycerides over 400 on fasting labs.  Review of past labs reveals elevation in  then significantly increased with wt gain.    Reviewed nutrition - no obvious sources of high fats.          Problem list and histories reviewed & adjusted, as indicated.  Additional history: as documented    Patient Active Problem List   Diagnosis     CARDIOVASCULAR SCREENING; LDL GOAL LESS THAN 160     Menopausal syndrome (hot flashes)     Tobacco abuse     Herpes simplex labialis     Hypertriglyceridemia     Primary osteoarthritis of both hands     Benign neoplasm of colon     Past Surgical History:   Procedure Laterality Date     CHOLECYSTECTOMY           COLONOSCOPY WITH CO2 INSUFFLATION N/A 2017    Procedure: COLONOSCOPY WITH CO2 INSUFFLATION;  COLON SCREEN/ MCGOWAN;  Surgeon: Nain Humphreys DO;  Location: [facility] OR     DILATION AND CURETTAGE       EXCISE MASS FINGER  5/15/2012    Procedure:EXCISE MASS FINGER; Left Middle Finger Mucinous Cyst Excision, Rotator Flap Distal Interphalangeal Debridement; Surgeon:MIGUELINA LUGO; Location:[facility] OR       Social History   Substance Use Topics     Smoking status: Current Every Day Smoker     Packs/day: 0.50     Years: 20.00     Types: Cigarettes     Smokeless tobacco: Never Used      Comment: 2 cigarettes daily      Alcohol use No     Family History   Problem Relation Age of Onset     Neurologic Disorder Father       of MS      Asthma No family hx of      C.A.D. No family hx of      DIABETES No family hx of      Hypertension No family hx of      CEREBROVASCULAR DISEASE No family hx of      Breast Cancer No family hx of      Cancer - colorectal No family hx of      Prostate Cancer No family hx of          Current Outpatient Prescriptions   Medication Sig Dispense Refill     fenofibrate 48 MG tablet Take 1 tablet  (48 mg) by mouth daily 30 tablet 1     Fexofenadine HCl (ALLEGRA PO)        valACYclovir (VALTREX) 1000 mg tablet Take 2 tablets (2,000 mg) by mouth 2 times daily 2 grams bid for 1 day 8 tablet 3     Recent Labs   Lab Test  09/18/17   0843  04/30/13   0900  05/04/12   0959  02/11/11   0800   LDL  Cannot estimate LDL when triglyceride exceeds 400 mg/dL  181*  127   --   131*   HDL  37*  39*   --   43*   TRIG  446*  323*   --   155*   CR   --    --   0.83   --    GFRESTIMATED   --    --   74   --    GFRESTBLACK   --    --   90   --    POTASSIUM   --    --   4.0   --    TSH   --   1.78  1.28  1.24      BP Readings from Last 3 Encounters:   11/20/17 119/81   11/08/17 111/90   09/18/17 124/78    Wt Readings from Last 3 Encounters:   11/20/17 155 lb 6.4 oz (70.5 kg)   11/01/17 154 lb (69.9 kg)   09/18/17 154 lb (69.9 kg)                          Reviewed and updated as needed this visit by clinical staffTobacco  Allergies  Meds  Problems  Med Hx  Surg Hx  Fam Hx  Soc Hx        Reviewed and updated as needed this visit by Provider  Allergies  Meds  Problems         ROS:  Constitutional, HEENT, cardiovascular, pulmonary, gi and gu systems are negative, except as otherwise noted.      OBJECTIVE:   /81  Pulse 101  Temp 98.5  F (36.9  C) (Oral)  Wt 155 lb 6.4 oz (70.5 kg)  LMP 01/16/2016  BMI 28.42 kg/m2  Body mass index is 28.42 kg/(m^2).  GENERAL: healthy, alert and no distress  EYES: Eyes grossly normal to inspection, PERRL and conjunctivae and sclerae normal  MS: no gross musculoskeletal defects noted, no edema  SKIN: no suspicious lesions or rashes  PSYCH: mentation appears normal, affect normal/bright    Diagnostic Test Results:  none     ASSESSMENT/PLAN:     (E78.1) Hypertriglyceridemia  (primary encounter diagnosis)  Comment: elevated such that pancreatitis is a risk  Plan: fenofibrate 48 MG tablet, Lipid panel reflex to        direct LDL Fasting, Hepatic panel        Reviewed nutrition changes,  handouts given.  Discussed wt loss and strategies to start that  Start fenofibrate, recheck labs in 6 weeks.    (Z11.9) Screening examination for infectious disease  Comment: due  Plan: Hepatitis C Screen Reflex to HCV RNA Quant and         Genotype              See Patient Instructions    Cathie Smith MD  [facility]

## 2017-12-20 DIAGNOSIS — E78.1 HYPERTRIGLYCERIDEMIA: ICD-10-CM

## 2017-12-20 DIAGNOSIS — Z11.9 SCREENING EXAMINATION FOR INFECTIOUS DISEASE: ICD-10-CM

## 2017-12-20 LAB
ALBUMIN SERPL-MCNC: 3.9 G/DL (ref 3.4–5)
ALP SERPL-CCNC: 98 U/L (ref 40–150)
ALT SERPL W P-5'-P-CCNC: 26 U/L (ref 0–50)
AST SERPL W P-5'-P-CCNC: 22 U/L (ref 0–45)
BILIRUB DIRECT SERPL-MCNC: <0.1 MG/DL (ref 0–0.2)
BILIRUB SERPL-MCNC: 0.3 MG/DL (ref 0.2–1.3)
CHOLEST SERPL-MCNC: 220 MG/DL
HDLC SERPL-MCNC: 46 MG/DL
LDLC SERPL CALC-MCNC: 129 MG/DL
NONHDLC SERPL-MCNC: 174 MG/DL
PROT SERPL-MCNC: 7.4 G/DL (ref 6.8–8.8)
TRIGL SERPL-MCNC: 223 MG/DL

## 2017-12-20 PROCEDURE — 86803 HEPATITIS C AB TEST: CPT | Performed by: FAMILY MEDICINE

## 2017-12-20 PROCEDURE — 80061 LIPID PANEL: CPT | Performed by: FAMILY MEDICINE

## 2017-12-20 PROCEDURE — 36415 COLL VENOUS BLD VENIPUNCTURE: CPT | Performed by: FAMILY MEDICINE

## 2017-12-20 PROCEDURE — 80076 HEPATIC FUNCTION PANEL: CPT | Performed by: FAMILY MEDICINE

## 2017-12-21 LAB — HCV AB SERPL QL IA: NONREACTIVE

## 2017-12-22 ENCOUNTER — TELEPHONE (OUTPATIENT)
Dept: FAMILY MEDICINE | Facility: CLINIC | Age: 52
End: 2017-12-22

## 2017-12-22 NOTE — TELEPHONE ENCOUNTER
11/20/17, office visit notes states repeat fasting labs in 6 weeks.  Patient has an additional refill at pharmacy.  Please help the pt schedule an appointment for fasting labs.  Fasting labs include nothing to eat or drink 10-12 hours prior to your lab draw, water is okay.  Coffee, tea, breath mints and chewing gum all count as food.  Julienne Ochoa RN

## 2017-12-22 NOTE — LETTER
December 22, 2017    Tika Mendoza   [address] [id number]  [facility] [address]    Dear Tika,       We recently received a refill request for fenofibrate 48 MG tablet.     Fasting Lab office visit      Please call at your earliest convenience so that there will not be a delay with your future refills.          Thank you,   Your [facility] Team  [phone]

## 2017-12-22 NOTE — TELEPHONE ENCOUNTER
Patient is calling for refill RX: fenofibrate 48 MG tablet, states she has only for a month and will need future refill. Thank you.

## 2017-12-26 DIAGNOSIS — E78.1 HYPERTRIGLYCERIDEMIA: ICD-10-CM

## 2017-12-27 RX ORDER — FENOFIBRATE 48 MG/1
TABLET, COATED ORAL
Qty: 30 TABLET | Refills: 5 | Status: SHIPPED | OUTPATIENT
Start: 2017-12-27 | End: 2018-07-02

## 2017-12-27 NOTE — TELEPHONE ENCOUNTER
Patient is calling received letter she needed to do fasting labs, patient is calling stating she already had labs done 12/20/17. Please call to discuss. Thank you.

## 2017-12-28 NOTE — TELEPHONE ENCOUNTER
LM for patient to disregard letter that was sent, Labs were completed 12/20/2017.  DAVID Hernandez/Kate

## 2018-06-19 ENCOUNTER — DOCUMENTATION ONLY (OUTPATIENT)
Dept: LAB | Facility: CLINIC | Age: 53
End: 2018-06-19

## 2018-06-19 DIAGNOSIS — Z13.1 SCREENING FOR DIABETES MELLITUS: ICD-10-CM

## 2018-06-19 DIAGNOSIS — E78.1 HYPERTRIGLYCERIDEMIA: Primary | ICD-10-CM

## 2018-06-20 NOTE — PROGRESS NOTES
Please review and sign Pending Pre-visit Labs in [facility]. Labs 06/27/18 and Cholesterol follow up 07/02/18    Lea HERNANDEZ

## 2018-06-26 NOTE — PROGRESS NOTES
SUBJECTIVE:                                                    Tika Mendoza is a 52 year old female who presents to clinic today for the following health issues:    ED/UC Followup:    Facility:  [facility]  Date of visit: 18  Reason for visit: UTI - treated with cephalexin   Current Status: pain in side and at end of urination      Problem list and histories reviewed & adjusted, as indicated.  Additional history: as documented    Patient Active Problem List   Diagnosis     CARDIOVASCULAR SCREENING; LDL GOAL LESS THAN 160     Menopausal syndrome (hot flashes)     Tobacco abuse     Herpes simplex labialis     Hypertriglyceridemia     Primary osteoarthritis of both hands     Benign neoplasm of colon     Past Surgical History:   Procedure Laterality Date     CHOLECYSTECTOMY           COLONOSCOPY WITH CO2 INSUFFLATION N/A 2017    Procedure: COLONOSCOPY WITH CO2 INSUFFLATION;  COLON SCREEN/ MCGOWAN;  Surgeon: Nain Humphreys DO;  Location: [facility] OR     DILATION AND CURETTAGE       EXCISE MASS FINGER  5/15/2012    Procedure:EXCISE MASS FINGER; Left Middle Finger Mucinous Cyst Excision, Rotator Flap Distal Interphalangeal Debridement; Surgeon:MIGUELINA LUGO; Location:[facility] OR       Social History   Substance Use Topics     Smoking status: Current Every Day Smoker     Packs/day: 0.50     Years: 20.00     Types: Cigarettes     Smokeless tobacco: Never Used      Comment: 2 cigarettes daily      Alcohol use No     Family History   Problem Relation Age of Onset     Neurologic Disorder Father       of MS      Asthma No family hx of      C.A.D. No family hx of      Diabetes No family hx of      Hypertension No family hx of      Cerebrovascular Disease No family hx of      Breast Cancer No family hx of      Cancer - colorectal No family hx of      Prostate Cancer No family hx of          Current Outpatient Prescriptions   Medication Sig Dispense Refill     ciprofloxacin (CIPRO) 500 MG  "tablet Take 1 tablet (500 mg) by mouth 2 times daily 20 tablet 0     fenofibrate 48 MG tablet TAKE 1 TABLET(48 MG) BY MOUTH DAILY 30 tablet 5     Fexofenadine HCl (ALLEGRA PO)        valACYclovir (VALTREX) 1000 mg tablet Take 2 tablets (2,000 mg) by mouth 2 times daily 2 grams bid for 1 day 8 tablet 3     Allergies   Allergen Reactions     Tolmetin Anaphylaxis     Sulfa Drugs      Severe headaches     [place] Rash     Labs reviewed in EPIC    ROS:  Constitutional, HEENT, cardiovascular, pulmonary, gi and gu systems are negative, except as otherwise noted.    OBJECTIVE:     /79  Pulse 70  Temp 97.7  F (36.5  C) (Oral)  Resp 16  Ht 5' 2\" (1.575 m)  Wt 154 lb (69.9 kg)  LMP 01/16/2016  SpO2 97%  BMI 28.17 kg/m2  Body mass index is 28.17 kg/(m^2).  GENERAL: healthy, alert and no distress  RESP: lungs clear to auscultation - no rales, rhonchi or wheezes  CV: regular rate and rhythm, normal S1 S2, no S3 or S4, no murmur, click or rub, no peripheral edema and peripheral pulses strong  ABDOMEN: soft, no mild suprapubic, no hepatosplenomegaly, no masses and bowel sounds normal  No CVA tenderness       Diagnostic Test Results:  Results for orders placed or performed in visit on 06/27/18 (from the past 24 hour(s))   UA reflex to Microscopic and Culture   Result Value Ref [facility]    Color Urine Yellow     Appearance Urine Clear     Glucose Urine Negative NEG^Negative mg/dL    Bilirubin Urine Negative NEG^Negative    Ketones Urine Negative NEG^Negative mg/dL    Specific Gravity Urine 1.010 1.003 - 1.035    Blood Urine Moderate (A) NEG^Negative    pH Urine 5.5 5.0 - 7.0 pH    Protein Albumin Urine Negative NEG^Negative mg/dL    Urobilinogen Urine 0.2 0.2 - 1.0 EU/dL    Nitrite Urine Negative NEG^Negative    Leukocyte Esterase Urine Small (A) NEG^Negative    Source Midstream Urine    Urine Microscopic   Result Value Ref [facility]    WBC Urine 5-10 (A) OTO5^0 - 5 /HPF    RBC Urine 5-10 (A) OTO2^O - 2 /HPF    Squamous " Epithelial /LPF Urine Few FEW^Few /LPF       ASSESSMENT/PLAN:         ICD-10-CM    1. Acute cystitis with hematuria N30.01 ciprofloxacin (CIPRO) 500 MG tablet     *UA reflex to Microscopic and Culture ([facility] (except [place] and [place])     Urine Culture Aerobic Bacterial   2. Dysuria R30.0 UA reflex to Microscopic and Culture     Urine Microscopic     Urine Culture Aerobic Bacterial   start cipro. warning signs discussed. side effects discussed  Push water.  Recheck UA in 10 days. Follow up  Dependant on results.     Onur Elils PA-C  [facility]

## 2018-06-27 ENCOUNTER — OFFICE VISIT (OUTPATIENT)
Dept: FAMILY MEDICINE | Facility: CLINIC | Age: 53
End: 2018-06-27
Payer: COMMERCIAL

## 2018-06-27 VITALS
RESPIRATION RATE: 16 BRPM | TEMPERATURE: 97.7 F | OXYGEN SATURATION: 97 % | BODY MASS INDEX: 28.34 KG/M2 | DIASTOLIC BLOOD PRESSURE: 79 MMHG | WEIGHT: 154 LBS | HEIGHT: 62 IN | HEART RATE: 70 BPM | SYSTOLIC BLOOD PRESSURE: 118 MMHG

## 2018-06-27 DIAGNOSIS — E78.1 HYPERTRIGLYCERIDEMIA: ICD-10-CM

## 2018-06-27 DIAGNOSIS — R30.0 DYSURIA: ICD-10-CM

## 2018-06-27 DIAGNOSIS — Z13.1 SCREENING FOR DIABETES MELLITUS: ICD-10-CM

## 2018-06-27 DIAGNOSIS — N30.01 ACUTE CYSTITIS WITH HEMATURIA: Primary | ICD-10-CM

## 2018-06-27 LAB
ALBUMIN UR-MCNC: NEGATIVE MG/DL
APPEARANCE UR: CLEAR
BILIRUB UR QL STRIP: NEGATIVE
CHOLEST SERPL-MCNC: 248 MG/DL
COLOR UR AUTO: YELLOW
GLUCOSE SERPL-MCNC: 91 MG/DL (ref 70–99)
GLUCOSE UR STRIP-MCNC: NEGATIVE MG/DL
HDLC SERPL-MCNC: 39 MG/DL
HGB UR QL STRIP: ABNORMAL
KETONES UR STRIP-MCNC: NEGATIVE MG/DL
LDLC SERPL CALC-MCNC: 145 MG/DL
LEUKOCYTE ESTERASE UR QL STRIP: ABNORMAL
NITRATE UR QL: NEGATIVE
NON-SQ EPI CELLS #/AREA URNS LPF: ABNORMAL /LPF
NONHDLC SERPL-MCNC: 209 MG/DL
PH UR STRIP: 5.5 PH (ref 5–7)
RBC #/AREA URNS AUTO: ABNORMAL /HPF
SOURCE: ABNORMAL
SP GR UR STRIP: 1.01 (ref 1–1.03)
TRIGL SERPL-MCNC: 322 MG/DL
UROBILINOGEN UR STRIP-ACNC: 0.2 EU/DL (ref 0.2–1)
WBC #/AREA URNS AUTO: ABNORMAL /HPF

## 2018-06-27 PROCEDURE — 99213 OFFICE O/P EST LOW 20 MIN: CPT | Performed by: PHYSICIAN ASSISTANT

## 2018-06-27 PROCEDURE — 82947 ASSAY GLUCOSE BLOOD QUANT: CPT | Performed by: FAMILY MEDICINE

## 2018-06-27 PROCEDURE — 36415 COLL VENOUS BLD VENIPUNCTURE: CPT | Performed by: FAMILY MEDICINE

## 2018-06-27 PROCEDURE — 87086 URINE CULTURE/COLONY COUNT: CPT | Performed by: PHYSICIAN ASSISTANT

## 2018-06-27 PROCEDURE — 81001 URINALYSIS AUTO W/SCOPE: CPT | Performed by: PHYSICIAN ASSISTANT

## 2018-06-27 PROCEDURE — 80061 LIPID PANEL: CPT | Performed by: FAMILY MEDICINE

## 2018-06-27 RX ORDER — CIPROFLOXACIN 500 MG/1
500 TABLET, FILM COATED ORAL 2 TIMES DAILY
Qty: 20 TABLET | Refills: 0 | Status: SHIPPED | OUTPATIENT
Start: 2018-06-27 | End: 2018-11-14

## 2018-06-27 ASSESSMENT — PAIN SCALES - GENERAL: PAINLEVEL: MILD PAIN (2)

## 2018-06-27 NOTE — LETTER
June 28, 2018    Tika Mendoza  [address] [address]  [place] [facility] [address]        Ms. Mendoza,    All of your labs were normal for you.    Please contact the clinic if you have additional questions.  Thank you.    Sincerely,    Onur Ellis PA-C     Results for orders placed or performed in visit on 06/27/18   UA reflex to Microscopic and Culture   Result Value Ref [facility]    Color Urine Yellow     Appearance Urine Clear     Glucose Urine Negative NEG^Negative mg/dL    Bilirubin Urine Negative NEG^Negative    Ketones Urine Negative NEG^Negative mg/dL    Specific Gravity Urine 1.010 1.003 - 1.035    Blood Urine Moderate (A) NEG^Negative    pH Urine 5.5 5.0 - 7.0 pH    Protein Albumin Urine Negative NEG^Negative mg/dL    Urobilinogen Urine 0.2 0.2 - 1.0 EU/dL    Nitrite Urine Negative NEG^Negative    Leukocyte Esterase Urine Small (A) NEG^Negative    Source Midstream Urine    Urine Microscopic   Result Value Ref [facility]    WBC Urine 5-10 (A) OTO5^0 - 5 /HPF    RBC Urine 5-10 (A) OTO2^O - 2 /HPF    Squamous Epithelial /LPF Urine Few FEW^Few /LPF   Urine Culture Aerobic Bacterial   Result Value Ref [facility]    Specimen Description Midstream Urine     Culture Micro       <10,000 colonies/mL  mixed urogenital lela  Susceptibility testing not routinely done                          Pt c/o headache x 1 day. Took 400 mg of iburpofen and there as no relief. Pt states light bothers her but denies blurred vision.

## 2018-06-27 NOTE — NURSING NOTE
"Chief Complaint   Patient presents with     Urinary Problem     Health Maintenance     HIV       Initial /79  Pulse 70  Temp 97.7  F (36.5  C) (Oral)  Resp 16  Ht 5' 2\" (1.575 m)  Wt 154 lb (69.9 kg)  LMP 01/16/2016  SpO2 97%  BMI 28.17 kg/m2 Estimated body mass index is 28.17 kg/(m^2) as calculated from the following:    Height as of this encounter: 5' 2\" (1.575 m).    Weight as of this encounter: 154 lb (69.9 kg).  Medication Reconciliation: complete  Jenny Garza, DENA    "

## 2018-06-27 NOTE — MR AVS SNAPSHOT
After Visit Summary   6/27/2018    Tika Mendoza    MRN: [id number]           Patient Information     Date Of Birth          1965        Visit Information        Provider Department      6/27/2018 8:40 AM Onur Ellis PA-C [facility]        Today's Diagnoses     Acute cystitis with hematuria    -  1    Dysuria           Follow-ups after your visit        Your next 10 appointments already scheduled     Jul 02, 2018  2:00 PM CDT   Office Visit with Cathie Smith MD   [facility] ([facility])    [address] [facility] [address] [phone]           Bring a current list of meds and any records pertaining to this visit. For Physicals, please bring immunization records and any forms needing to be filled out. Please arrive 10 minutes early to complete paperwork.              Future tests that were ordered for you today     Open Future Orders        Priority Expected Expires Ordered    *UA reflex to Microscopic and Culture ([facility] (except [place] and [place]) Routine 7/4/2018 7/27/2018 6/27/2018            Who to contact     If you have questions or need follow up information about today's clinic visit or your schedule please contact [facility] directly at [phone].  Normal or non-critical lab and imaging results will be communicated to you by MyChart, letter or phone within 4 business days after the clinic has received the results. If you do not hear from us within 7 days, please contact the clinic through MyChart or phone. If you have a critical or abnormal lab result, we will notify you by phone as soon as possible.  Submit refill requests through [employer] or call your pharmacy and they will forward the refill request to us. Please allow 3 business days for your refill to be completed.          Additional Information About Your Visit        Care EveryWhere ID     This is your Care  "EveryWhere ID. This could be used by other organizations to access your [place] medical records  [id number]        Your Vitals Were     Pulse Temperature Respirations Height Last Period Pulse Oximetry    70 97.7  F (36.5  C) (Oral) 16 5' 2\" (1.575 m) 01/16/2016 97%    BMI (Body Mass Index)                   28.17 kg/m2            Blood Pressure from Last 3 Encounters:   06/27/18 118/79   11/20/17 119/81   11/08/17 111/90    Weight from Last 3 Encounters:   06/27/18 154 lb (69.9 kg)   11/20/17 155 lb 6.4 oz (70.5 kg)   11/01/17 154 lb (69.9 kg)              We Performed the Following     UA reflex to Microscopic and Culture     Urine Microscopic          Today's Medication Changes          These changes are accurate as of 6/27/18  9:04 AM.  If you have any questions, ask your nurse or doctor.               Start taking these medicines.        Dose/Directions    ciprofloxacin 500 MG tablet   Commonly known as:  CIPRO   Used for:  Acute cystitis with hematuria   Started by:  Onur Ellis PA-C        Dose:  500 mg   Take 1 tablet (500 mg) by mouth 2 times daily   Quantity:  20 tablet   Refills:  0            Where to get your medicines      These medications were sent to [id number] - [facility] [address] [place] [address] & [facility]  [address] [facility] [address]    Hours:  24-hours Phone:  [phone]     ciprofloxacin 500 MG tablet                Primary Care Provider Office Phone # Fax #    Cathie Smith [phone] [address] [facility] [address]        Equal Access to Services     ARIEL NEGRETE AH: Hadii kodi Avila, wakylieda luqadaha, qaybta kaalmada jess, hannah posey. So [place] [phone].    ATENCIÓN: Si habla español, tiene a alfaro disposición servicios gratuitos de asistencia lingüística. Llame al [phone].    We comply with applicable federal civil rights laws " and [place] laws. We do not discriminate on the basis of race, color, national origin, age, disability, sex, sexual orientation, or gender identity.            Thank you!     [facility] you for [facility]  [facility]. Our goal is always to provide you with excellent care. Hearing back from our patients is one way we can continue to improve our services. Please take a few minutes to complete the written survey that you may receive in the mail after your visit with us. Thank you!             Your Updated Medication List - Protect others around you: Learn how to safely use, store and throw away your medicines at www.disposemymeds.org.          This list is accurate as of 6/27/18  9:04 AM.  Always use your most recent med list.                   Brand Name Dispense Instructions for use Diagnosis    ALLEGRA PO           ciprofloxacin 500 MG tablet    CIPRO    20 tablet    Take 1 tablet (500 mg) by mouth 2 times daily    Acute cystitis with hematuria       fenofibrate 48 MG tablet     30 tablet    TAKE 1 TABLET(48 MG) BY MOUTH DAILY    Hypertriglyceridemia       valACYclovir 1000 mg tablet    VALTREX    8 tablet    Take 2 tablets (2,000 mg) by mouth 2 times daily 2 grams bid for 1 day    Herpes simplex labialis

## 2018-06-28 LAB
BACTERIA SPEC CULT: NORMAL
SPECIMEN SOURCE: NORMAL

## 2018-06-28 NOTE — PROGRESS NOTES
MsStanley Mendoza,    All of your labs were normal for you.    Please contact the clinic if you have additional questions.  Thank you.    Sincerely,    Onur Ellis PA-C

## 2018-07-02 ENCOUNTER — OFFICE VISIT (OUTPATIENT)
Dept: FAMILY MEDICINE | Facility: CLINIC | Age: 53
End: 2018-07-02
Payer: COMMERCIAL

## 2018-07-02 VITALS
DIASTOLIC BLOOD PRESSURE: 86 MMHG | OXYGEN SATURATION: 98 % | HEART RATE: 82 BPM | WEIGHT: 152.4 LBS | BODY MASS INDEX: 27.87 KG/M2 | TEMPERATURE: 98.6 F | RESPIRATION RATE: 18 BRPM | SYSTOLIC BLOOD PRESSURE: 128 MMHG

## 2018-07-02 DIAGNOSIS — E78.1 HYPERTRIGLYCERIDEMIA: ICD-10-CM

## 2018-07-02 DIAGNOSIS — Z91.89 10 YEAR RISK OF MI OR STROKE 7.5% OR GREATER: Primary | ICD-10-CM

## 2018-07-02 PROCEDURE — 99213 OFFICE O/P EST LOW 20 MIN: CPT | Performed by: FAMILY MEDICINE

## 2018-07-02 RX ORDER — FENOFIBRATE 48 MG/1
48 TABLET, COATED ORAL DAILY
Qty: 30 TABLET | Refills: 5 | Status: SHIPPED | OUTPATIENT
Start: 2018-07-02 | End: 2018-11-19

## 2018-07-02 NOTE — NURSING NOTE
"Chief Complaint   Patient presents with     Lipids       Initial /86  Pulse 82  Temp 98.6  F (37  C) (Oral)  Resp 18  Wt 152 lb 6.4 oz (69.1 kg)  LMP 01/16/2016  SpO2 98%  BMI 27.87 kg/m2 Estimated body mass index is 27.87 kg/(m^2) as calculated from the following:    Height as of 6/27/18: 5' 2\" (1.575 m).    Weight as of this encounter: 152 lb 6.4 oz (69.1 kg).  Medication Reconciliation: complete  Omega Hall CMA    "

## 2018-07-08 DIAGNOSIS — N30.01 ACUTE CYSTITIS WITH HEMATURIA: ICD-10-CM

## 2018-07-08 LAB
ALBUMIN UR-MCNC: NEGATIVE MG/DL
APPEARANCE UR: CLEAR
BACTERIA #/AREA URNS HPF: ABNORMAL /HPF
BILIRUB UR QL STRIP: NEGATIVE
COLOR UR AUTO: YELLOW
GLUCOSE UR STRIP-MCNC: NEGATIVE MG/DL
HGB UR QL STRIP: NEGATIVE
KETONES UR STRIP-MCNC: NEGATIVE MG/DL
LEUKOCYTE ESTERASE UR QL STRIP: ABNORMAL
NITRATE UR QL: NEGATIVE
NON-SQ EPI CELLS #/AREA URNS LPF: ABNORMAL /LPF
PH UR STRIP: 7 PH (ref 5–7)
RBC #/AREA URNS AUTO: ABNORMAL /HPF
SOURCE: ABNORMAL
SP GR UR STRIP: 1.01 (ref 1–1.03)
UROBILINOGEN UR STRIP-ACNC: 0.2 EU/DL (ref 0.2–1)
WBC #/AREA URNS AUTO: ABNORMAL /HPF

## 2018-07-08 PROCEDURE — 81001 URINALYSIS AUTO W/SCOPE: CPT | Performed by: PHYSICIAN ASSISTANT

## 2018-10-11 ENCOUNTER — RADIANT APPOINTMENT (OUTPATIENT)
Dept: MAMMOGRAPHY | Facility: CLINIC | Age: 53
End: 2018-10-11
Attending: FAMILY MEDICINE
Payer: COMMERCIAL

## 2018-10-11 DIAGNOSIS — Z12.31 SCREENING MAMMOGRAM, ENCOUNTER FOR: ICD-10-CM

## 2018-10-11 PROCEDURE — 77067 SCR MAMMO BI INCL CAD: CPT | Mod: TC

## 2018-11-05 DIAGNOSIS — Z91.89 10 YEAR RISK OF MI OR STROKE 7.5% OR GREATER: ICD-10-CM

## 2018-11-05 DIAGNOSIS — E78.1 HYPERTRIGLYCERIDEMIA: ICD-10-CM

## 2018-11-05 LAB
CHOLEST SERPL-MCNC: 259 MG/DL
HDLC SERPL-MCNC: 40 MG/DL
LDLC SERPL CALC-MCNC: 166 MG/DL
NONHDLC SERPL-MCNC: 219 MG/DL
TRIGL SERPL-MCNC: 266 MG/DL

## 2018-11-05 PROCEDURE — 80061 LIPID PANEL: CPT | Performed by: FAMILY MEDICINE

## 2018-11-05 PROCEDURE — 36415 COLL VENOUS BLD VENIPUNCTURE: CPT | Performed by: FAMILY MEDICINE

## 2018-11-14 ENCOUNTER — TELEPHONE (OUTPATIENT)
Dept: FAMILY MEDICINE | Facility: CLINIC | Age: 53
End: 2018-11-14

## 2018-11-14 NOTE — TELEPHONE ENCOUNTER
Patient is calling due to a letter that she has received from Dr. Smith.  She would like to make an appointment, but you have nothing available,  She is leaving to go to [place] until May 1 on 11/25/2018.  Is there a way you would be able to talk to her over the phone or work her in before the 25th.  Please call to advise.  Thank you

## 2018-11-19 ENCOUNTER — OFFICE VISIT (OUTPATIENT)
Dept: FAMILY MEDICINE | Facility: CLINIC | Age: 53
End: 2018-11-19
Payer: COMMERCIAL

## 2018-11-19 VITALS
WEIGHT: 145 LBS | TEMPERATURE: 99 F | DIASTOLIC BLOOD PRESSURE: 77 MMHG | OXYGEN SATURATION: 99 % | HEART RATE: 79 BPM | SYSTOLIC BLOOD PRESSURE: 117 MMHG | RESPIRATION RATE: 18 BRPM | BODY MASS INDEX: 26.68 KG/M2 | HEIGHT: 62 IN

## 2018-11-19 DIAGNOSIS — B00.1 HERPES SIMPLEX LABIALIS: ICD-10-CM

## 2018-11-19 DIAGNOSIS — E78.1 HYPERTRIGLYCERIDEMIA: ICD-10-CM

## 2018-11-19 PROCEDURE — 99213 OFFICE O/P EST LOW 20 MIN: CPT | Performed by: FAMILY MEDICINE

## 2018-11-19 RX ORDER — VALACYCLOVIR HYDROCHLORIDE 1 G/1
2000 TABLET, FILM COATED ORAL 2 TIMES DAILY
Qty: 8 TABLET | Refills: 3 | Status: SHIPPED | OUTPATIENT
Start: 2018-11-19 | End: 2019-11-03

## 2018-11-19 RX ORDER — FENOFIBRATE 48 MG/1
48 TABLET, COATED ORAL DAILY
Qty: 90 TABLET | Refills: 3 | Status: SHIPPED | OUTPATIENT
Start: 2018-11-19 | End: 2019-11-04

## 2018-11-19 ASSESSMENT — PAIN SCALES - GENERAL: PAINLEVEL: NO PAIN (0)

## 2018-11-19 NOTE — NURSING NOTE
"Chief Complaint   Patient presents with     Lipids       Initial /77  Pulse 79  Temp 99  F (37.2  C) (Oral)  Resp 18  Ht 5' 2\" (1.575 m)  Wt 145 lb (65.8 kg)  LMP 01/16/2016  SpO2 99%  BMI 26.52 kg/m2 Estimated body mass index is 26.52 kg/(m^2) as calculated from the following:    Height as of this encounter: 5' 2\" (1.575 m).    Weight as of this encounter: 145 lb (65.8 kg).  Medication Reconciliation: complete  Rebeca Singh M.A.    "

## 2018-11-19 NOTE — PROGRESS NOTES
"The 10-year ASCVD risk score (Jonathonlee MELENDEZ Jr, et al., 2013) is: 6.7%    Values used to calculate the score:      Age: 53 years      Sex: Female      Is Non- : No      Diabetic: No      Tobacco smoker: Yes      Systolic Blood Pressure: 117 mmHg      Is BP treated: No      HDL Cholesterol: 40 mg/dL      Total Cholesterol: 259 mg/dL   SUBJECTIVE:  53 year old enters for recheck of high cholesterol.  Pt. Has been taking med and has no side effects. Pt is following diet.  Denies chest pain and SOB.  Past Medical History:   Diagnosis Date     Osteoarthritis 4/30/2013     PONV (postoperative nausea and vomiting)      Past Surgical History:   Procedure Laterality Date     CHOLECYSTECTOMY      1989     COLONOSCOPY WITH CO2 INSUFFLATION N/A 11/8/2017    Procedure: COLONOSCOPY WITH CO2 INSUFFLATION;  COLON SCREEN/ MCGOWAN;  Surgeon: Nain Humphreys DO;  Location: [facility]     DILATION AND CURETTAGE       EXCISE MASS FINGER  5/15/2012    Procedure:EXCISE MASS FINGER; Left Middle Finger Mucinous Cyst Excision, Rotator Flap Distal Interphalangeal Debridement; Surgeon:MIGUELINA LUGO; Location:[facility]       Current Outpatient Prescriptions:      fenofibrate 48 MG tablet, Take 1 tablet (48 mg) by mouth daily, Disp: 30 tablet, Rfl: 5     Fexofenadine HCl (ALLEGRA PO), , Disp: , Rfl:      valACYclovir (VALTREX) 1000 mg tablet, Take 2 tablets (2,000 mg) by mouth 2 times daily 2 grams bid for 1 day, Disp: 8 tablet, Rfl: 3  Reviewed health maintenance   Patient Active Problem List   Diagnosis     CARDIOVASCULAR SCREENING; LDL GOAL LESS THAN 160     Menopausal syndrome (hot flashes)     Tobacco abuse     Herpes simplex labialis     Hypertriglyceridemia     Primary osteoarthritis of both hands     Benign neoplasm of colon       OBJECTIVE:  no apparent distress  /77  Pulse 79  Temp 99  F (37.2  C) (Oral)  Resp 18  Ht 5' 2\" (1.575 m)  Wt 145 lb (65.8 kg)  LMP 01/16/2016  SpO2 99%  BMI 26.52 " kg/m2      Exam:  Constitutional: healthy, alert and no distress  Head: Normocephalic. No masses, lesions, tenderness or abnormalities  Neck: Neck supple. No adenopathy. Thyroid symmetric, normal size,  Cardiovascular: negative, PMI normal. No lifts, heaves, or thrills. RRR. No murmurs, clicks gallops or rub  Respiratory: negative Lungs clear    Orders Only on 11/05/2018   Component Date Value Ref [facility] Status     Cholesterol 11/05/2018 259* <200 mg/dL Final    Desirable:       <200 mg/dl     Triglycerides 11/05/2018 266* <150 mg/dL Final    Comment: Borderline high:  150-199 mg/dl  High:             200-499 mg/dl  Very high:       >499 mg/dl  Fasting specimen       HDL Cholesterol 11/05/2018 40* >49 mg/dL Final     LDL Cholesterol Calculated 11/05/2018 166* <100 mg/dL Final    Comment: Above desirable:  100-129 mg/dl  Borderline High:  130-159 mg/dL  High:             160-189 mg/dL  Very high:       >189 mg/dl       Non HDL Cholesterol 11/05/2018 219* <130 mg/dL Final    Comment: Above Desirable:  130-159 mg/dl  Borderline high:  160-189 mg/dl  High:             190-219 mg/dl  Very high:       >219 mg/dl             ICD-10-CM    1. Hypertriglyceridemia E78.1 fenofibrate 48 MG tablet   2. Herpes simplex labialis B00.1 valACYclovir (VALTREX) 1000 mg tablet    PLAN: Follow up in 1 year

## 2018-11-19 NOTE — MR AVS SNAPSHOT
"              After Visit Summary   11/19/2018    Tika Mendoza    MRN: [id number]           Patient Information     Date Of Birth          1965        Visit Information        Provider Department      11/19/2018 9:45 AM Jordi Allison MD [facility]        Today's Diagnoses     Hypertriglyceridemia        Herpes simplex labialis           Follow-ups after your visit        Who to contact     If you have questions or need follow up information about today's clinic visit or your schedule please contact [facility] directly at [phone].  Normal or non-critical lab and imaging results will be communicated to you by MyChart, letter or phone within 4 business days after the clinic has received the results. If you do not hear from us within 7 days, please contact the clinic through MyChart or phone. If you have a critical or abnormal lab result, we will notify you by phone as soon as possible.  Submit refill requests through [employer] or call your pharmacy and they will forward the refill request to us. Please allow 3 business days for your refill to be completed.          Additional Information About Your Visit        Care EveryWhere ID     This is your Care EveryWhere ID. This could be used by other organizations to access your [place] medical records  [id number]        Your Vitals Were     Pulse Temperature Respirations Height Last Period Pulse Oximetry    79 99  F (37.2  C) (Oral) 18 5' 2\" (1.575 m) 01/16/2016 99%    BMI (Body Mass Index)                   26.52 kg/m2            Blood Pressure from Last 3 Encounters:   11/19/18 117/77   07/02/18 128/86   06/27/18 118/79    Weight from Last 3 Encounters:   11/19/18 145 lb (65.8 kg)   07/02/18 152 lb 6.4 oz (69.1 kg)   06/27/18 154 lb (69.9 kg)              Today, you had the following     No orders found for display         Where to get your medicines      These medications were sent to [id number] - [place], [place] - [address] [facility] AT [facility] [address]  [address] [facility], [place] [address]    Hours:  24-hours Phone:  [phone]     fenofibrate 48 MG tablet    valACYclovir 1000 mg tablet          Primary Care Provider Office Phone # Fax #    Cathie Luanne Smith [phone] [address] [facility] [address]        Equal Access to Services     [facility] : Hadii aad ku hadasho Soomaali, waaxda luqadaha, qaybta kaalmada adeegyada, waxay idiin hayaan adeeg kharash la'aan . So [facility] [phone].    ATENCIÓN: Si habla español, tiene a alfaro disposición servicios gratuitos de asistencia lingüística. [facility] [phone].    We comply with applicable federal civil rights laws and [place] laws. We do not discriminate on the basis of race, color, national origin, age, disability, sex, sexual orientation, or gender identity.            Thank you!     Thank you for choosing [facility]  for your care. Our goal is always to provide you with excellent care. Hearing back from our patients is one way we can continue to improve our services. Please take a few minutes to complete the written survey that you may receive in the mail after your visit with us. Thank you!             Your Updated Medication List - Protect others around you: Learn how to safely use, store and throw away your medicines at www.disposemymeds.org.          This list is accurate as of 11/19/18 10:14 AM.  Always use your most recent med list.                   Brand Name Dispense Instructions for use Diagnosis    ALLEGRA PO           fenofibrate 48 MG tablet     90 tablet    Take 1 tablet (48 mg) by mouth daily    Hypertriglyceridemia       valACYclovir 1000 mg tablet    VALTREX    8 tablet    Take 2 tablets (2,000 mg) by mouth 2 times daily 2 grams bid for 1 day    Herpes simplex labialis

## 2019-10-23 ENCOUNTER — DOCUMENTATION ONLY (OUTPATIENT)
Dept: FAMILY MEDICINE | Facility: CLINIC | Age: 54
End: 2019-10-23

## 2019-10-23 DIAGNOSIS — E78.1 HYPERTRIGLYCERIDEMIA: ICD-10-CM

## 2019-10-23 DIAGNOSIS — Z13.1 SCREENING FOR DIABETES MELLITUS: Primary | ICD-10-CM

## 2019-10-23 NOTE — PROGRESS NOTES
Please review and sign Pending Pre-visit Labs in Epic. Labs 10/28/19 and Med Check/Cholesterol 11/04/19   Lea HERNANDEZ

## 2019-10-23 NOTE — PROGRESS NOTES
.Please place or confirm pending lab orders for upcoming lab appointment on 10/28/19  Thank you,  Camille

## 2019-10-24 ENCOUNTER — ANCILLARY PROCEDURE (OUTPATIENT)
Dept: MAMMOGRAPHY | Facility: CLINIC | Age: 54
End: 2019-10-24
Attending: FAMILY MEDICINE
Payer: COMMERCIAL

## 2019-10-24 DIAGNOSIS — Z12.31 VISIT FOR SCREENING MAMMOGRAM: ICD-10-CM

## 2019-10-24 PROCEDURE — 77067 SCR MAMMO BI INCL CAD: CPT | Mod: TC

## 2019-10-28 DIAGNOSIS — E78.1 HYPERTRIGLYCERIDEMIA: ICD-10-CM

## 2019-10-28 DIAGNOSIS — Z13.1 SCREENING FOR DIABETES MELLITUS: ICD-10-CM

## 2019-10-28 LAB
CHOLEST SERPL-MCNC: 228 MG/DL
GLUCOSE SERPL-MCNC: 96 MG/DL (ref 70–99)
HDLC SERPL-MCNC: 43 MG/DL
LDLC SERPL CALC-MCNC: 125 MG/DL
NONHDLC SERPL-MCNC: 185 MG/DL
TRIGL SERPL-MCNC: 301 MG/DL

## 2019-10-28 PROCEDURE — 80061 LIPID PANEL: CPT | Performed by: FAMILY MEDICINE

## 2019-10-28 PROCEDURE — 36415 COLL VENOUS BLD VENIPUNCTURE: CPT | Performed by: FAMILY MEDICINE

## 2019-10-28 PROCEDURE — 82947 ASSAY GLUCOSE BLOOD QUANT: CPT | Performed by: FAMILY MEDICINE

## 2019-10-29 NOTE — PROGRESS NOTES
Adore Mendoza is a 54 year old female who presents to clinic today for the following health issues:    History of Present Illness        Hyperlipidemia:  She presents for follow up of hyperlipidemia.  She is taking medication to lower cholesterol. She is not having myalgia or other side effects to statin medications.She is not reporting shortness of breath, increased sweating or nausea with activity, left-sided neck or arm pain, chest pain or pressure, or pain in calves when walking 1-2 blocks.    She eats 0-1 servings of fruits and vegetables daily.She consumes 2 sweetened beverage(s) daily.  She is taking medications regularly.       Has sporadic cold sores throughout the year.           Patient Active Problem List   Diagnosis     CARDIOVASCULAR SCREENING; LDL GOAL LESS THAN 160     Menopausal syndrome (hot flashes)     Tobacco abuse     Herpes simplex labialis     Hypertriglyceridemia     Primary osteoarthritis of both hands     Benign neoplasm of colon     Past Surgical History:   Procedure Laterality Date     CHOLECYSTECTOMY           COLONOSCOPY WITH CO2 INSUFFLATION N/A 2017    Procedure: COLONOSCOPY WITH CO2 INSUFFLATION;  COLON SCREEN/ MCGOWAN;  Surgeon: Nain Humphreys DO;  Location: [facility]     DILATION AND CURETTAGE       EXCISE MASS FINGER  5/15/2012    Procedure:EXCISE MASS FINGER; Left Middle Finger Mucinous Cyst Excision, Rotator Flap Distal Interphalangeal Debridement; Surgeon:MIGUELINA LUGO; Location:[facility]       Social History     Tobacco Use     Smoking status: Current Every Day Smoker     Packs/day: 0.50     Years: 20.00     Pack years: 10.00     Types: Cigarettes     Smokeless tobacco: Never Used     Tobacco comment: 2 cigarettes daily    Substance Use Topics     Alcohol use: No     Family History   Problem Relation Age of Onset     Neurologic Disorder Father          of MS      Asthma No family hx of      C.A.D. No family hx of      Diabetes No family hx of   "    Hypertension No family hx of      Cerebrovascular Disease No family hx of      Breast Cancer No family hx of      Cancer - colorectal No family hx of      Prostate Cancer No family hx of          Current Outpatient Medications   Medication Sig Dispense Refill     fenofibrate (TRICOR) 48 MG tablet Take 1 tablet (48 mg) by mouth daily 90 tablet 3     Fexofenadine HCl (ALLEGRA PO)        valACYclovir (VALTREX) 1000 mg tablet 2 grams twice a day for 1 day 12 tablet 3     BP Readings from Last 3 Encounters:   11/04/19 134/87   11/19/18 117/77   07/02/18 128/86    Wt Readings from Last 3 Encounters:   11/04/19 68.2 kg (150 lb 6.4 oz)   11/19/18 65.8 kg (145 lb)   07/02/18 69.1 kg (152 lb 6.4 oz)                    Reviewed and updated as needed this visit by Provider  Tobacco  Allergies  Meds  Problems  Med Hx  Surg Hx  Fam Hx         Review of Systems   ROS COMP: Constitutional, HEENT, cardiovascular, pulmonary, gi and gu systems are negative, except as otherwise noted.      Objective    /87   Pulse 75   Temp 98.2  F (36.8  C) (Oral)   Resp 18   Ht 1.575 m (5' 2\")   Wt 68.2 kg (150 lb 6.4 oz)   LMP 01/16/2016   BMI 27.51 kg/m    Body mass index is 27.51 kg/m .  Physical Exam   GENERAL: healthy, alert and no distress  EYES: Eyes grossly normal to inspection, PERRL and conjunctivae and sclerae normal  RESP: lungs clear to auscultation - no rales, rhonchi or wheezes  CV: regular rate and rhythm, normal S1 S2, no S3 or S4, no murmur, click or rub, no peripheral edema and peripheral pulses strong  MS: no gross musculoskeletal defects noted, no edema  SKIN: no suspicious lesions or rashes  PSYCH: mentation appears normal, affect normal/bright    Diagnostic Test Results:  Labs reviewed in Epic        Assessment & Plan     (E78.1) Hypertriglyceridemia  (primary encounter diagnosis)  Comment: elevated since last year due to increase eating out  Plan: fenofibrate (TRICOR) 48 MG tablet        Make diet " "changes, continue current dose.  Refill x 1 yr follow-up 1 year for preventive    (B00.1) Herpes simplex labialis  Comment: stable  Plan: valACYclovir (VALTREX) 1000 mg tablet        Refill x 1 yr     (Z72.0) Tobacco abuse  Comment: planning to quit with spouse at the end of the month  Plan: discussed options, will contact if needs gum/patch/med     Tobacco Cessation:   reports that she has been smoking cigarettes. She has a 10.00 pack-year smoking history. She has never used smokeless tobacco.  Tobacco Cessation Action Plan: Self help information given to patient      BMI:   Estimated body mass index is 27.51 kg/m  as calculated from the following:    Height as of this encounter: 1.575 m (5' 2\").    Weight as of this encounter: 68.2 kg (150 lb 6.4 oz).   Weight management plan: Discussed healthy diet and exercise guidelines        See Patient Instructions    Return in about 1 year (around 11/4/2020) for Preventive exam, Fasting Lab Work.    Cathie Smith MD  [facility]    "

## 2019-11-04 ENCOUNTER — OFFICE VISIT (OUTPATIENT)
Dept: FAMILY MEDICINE | Facility: CLINIC | Age: 54
End: 2019-11-04
Payer: COMMERCIAL

## 2019-11-04 VITALS
DIASTOLIC BLOOD PRESSURE: 87 MMHG | BODY MASS INDEX: 27.68 KG/M2 | HEIGHT: 62 IN | WEIGHT: 150.4 LBS | TEMPERATURE: 98.2 F | SYSTOLIC BLOOD PRESSURE: 134 MMHG | HEART RATE: 75 BPM | RESPIRATION RATE: 18 BRPM

## 2019-11-04 DIAGNOSIS — Z72.0 TOBACCO ABUSE: ICD-10-CM

## 2019-11-04 DIAGNOSIS — B00.1 HERPES SIMPLEX LABIALIS: ICD-10-CM

## 2019-11-04 DIAGNOSIS — E78.1 HYPERTRIGLYCERIDEMIA: Primary | ICD-10-CM

## 2019-11-04 PROCEDURE — 99214 OFFICE O/P EST MOD 30 MIN: CPT | Performed by: FAMILY MEDICINE

## 2019-11-04 RX ORDER — FENOFIBRATE 48 MG/1
48 TABLET, COATED ORAL DAILY
Qty: 90 TABLET | Refills: 3 | Status: SHIPPED | OUTPATIENT
Start: 2019-11-04 | End: 2020-11-18

## 2019-11-04 RX ORDER — VALACYCLOVIR HYDROCHLORIDE 1 G/1
TABLET, FILM COATED ORAL
Qty: 12 TABLET | Refills: 3 | Status: SHIPPED | OUTPATIENT
Start: 2019-11-04 | End: 2020-11-18

## 2019-11-04 ASSESSMENT — MIFFLIN-ST. JEOR: SCORE: 1235.46

## 2019-11-04 NOTE — NURSING NOTE
"Chief Complaint   Patient presents with     Lipids       Initial BP (!) 143/87   Pulse 75   Temp 98.2  F (36.8  C) (Oral)   Resp 18   Ht 1.575 m (5' 2\")   Wt 68.2 kg (150 lb 6.4 oz)   LMP 01/16/2016   BMI 27.51 kg/m   Estimated body mass index is 27.51 kg/m  as calculated from the following:    Height as of this encounter: 1.575 m (5' 2\").    Weight as of this encounter: 68.2 kg (150 lb 6.4 oz).  Medication Reconciliation: complete  Omega Hall CMA    "

## 2020-03-01 ENCOUNTER — HEALTH MAINTENANCE LETTER (OUTPATIENT)
Age: 55
End: 2020-03-01

## 2020-05-08 ENCOUNTER — TRANSFERRED RECORDS (OUTPATIENT)
Dept: HEALTH INFORMATION MANAGEMENT | Facility: CLINIC | Age: 55
End: 2020-05-08

## 2020-06-10 ENCOUNTER — TRANSFERRED RECORDS (OUTPATIENT)
Dept: HEALTH INFORMATION MANAGEMENT | Facility: CLINIC | Age: 55
End: 2020-06-10

## 2020-07-29 ENCOUNTER — TRANSFERRED RECORDS (OUTPATIENT)
Dept: HEALTH INFORMATION MANAGEMENT | Facility: CLINIC | Age: 55
End: 2020-07-29

## 2020-11-09 ENCOUNTER — DOCUMENTATION ONLY (OUTPATIENT)
Dept: LAB | Facility: CLINIC | Age: 55
End: 2020-11-09

## 2020-11-09 DIAGNOSIS — Z00.00 ROUTINE HISTORY AND PHYSICAL EXAMINATION OF ADULT: ICD-10-CM

## 2020-11-09 DIAGNOSIS — Z13.6 CARDIOVASCULAR SCREENING; LDL GOAL LESS THAN 160: Primary | ICD-10-CM

## 2020-11-09 DIAGNOSIS — Z13.1 SCREENING FOR DIABETES MELLITUS: ICD-10-CM

## 2020-11-09 DIAGNOSIS — E78.1 HYPERTRIGLYCERIDEMIA: ICD-10-CM

## 2020-11-09 NOTE — PROGRESS NOTES
SUBJECTIVE:   CC: Tika Mendoza is an 55 year old woman who presents for preventive health visit.       Patient has been advised of split billing requirements and indicates understanding: Yes  Healthy Habits:     Getting at least 3 servings of Calcium per day:  Yes    Bi-annual eye exam:  Yes    Dental care twice a year:  Yes    Sleep apnea or symptoms of sleep apnea:  None    Diet:  Regular (no restrictions)    Frequency of exercise:  4-5 days/week    Duration of exercise:  15-30 minutes    Taking medications regularly:  Yes    Barriers to taking medications:  None    Medication side effects:  None    PHQ-2 Total Score: 0    Additional concerns today:  Yes (derm problem on elbows, dark spots on face)              Today's PHQ-2 Score:   PHQ-2 (  [employer]) 2020   Q1: Little interest or pleasure in doing things 0   Q2: Feeling down, depressed or hopeless 0   PHQ-2 Score 0   Q1: Little interest or pleasure in doing things Not at all   Q2: Feeling down, depressed or hopeless Not at all   PHQ-2 Score 0       Abuse: Current or Past (Physical, Sexual or Emotional) - No  Do you feel safe in your environment? Yes    Have you ever done Advance Care Planning? (For example, a Health Directive, POLST, or a discussion with a medical provider or your loved ones about your wishes): No, advance care planning information given to patient to review.  Patient plans to discuss their wishes with loved ones or provider.      Social History     Tobacco Use     Smoking status: Former Smoker     Packs/day: 0.00     Years: 20.00     Pack years: 0.00     Types: Cigarettes     Quit date: 10/21/2020     Years since quittin.0     Smokeless tobacco: Never Used     Tobacco comment: 2 cigarettes daily    Substance Use Topics     Alcohol use: No         Alcohol Use 2017   Prescreen: >3 drinks/day or >7 drinks/week? The patient does not drink >3 drinks per day nor >7 drinks per week.       Reviewed orders with patient.  Reviewed  health maintenance and updated orders accordingly - Yes    G 0 P 0   Patient's last menstrual period was 01/16/2016.     Fasting: No   Td: due today       Last 3 Pap and HPV Results:   PAP / HPV Latest Ref Rng & Units 9/18/2017 2/18/2011   PAP - NIL OTHER-NIL EM>40   HPV 16 DNA NEG:Negative Negative -   HPV 18 DNA NEG:Negative Negative -   OTHER HR HPV NEG:Negative Negative -                  Cholesterol:   Lab Results   Component Value Date    CHOL 263 11/11/2020     Lab Results   Component Value Date    HDL 46 11/11/2020     Lab Results   Component Value Date     11/11/2020     Lab Results   Component Value Date    TRIG 315 11/11/2020     Lab Results   Component Value Date    CHOLHDLRATIO 5.9 04/30/2013     The 10-year ASCVD risk score (Jonathon MELENDEZ Jr., et al., 2013) is: 3.3%    Values used to calculate the score:      Age: 55 years      Sex: Female      Is Non- : No      Diabetic: No      Tobacco smoker: No      Systolic Blood Pressure: 132 mmHg      Is BP treated: No      HDL Cholesterol: 46 mg/dL      Total Cholesterol: 263 mg/dL      MMG: 10/19  Dexa:  NA     Flex/colo: 2017, q3 due       Seat Belt: Yes    Sunscreen use: Yes   Calcium Intake: adeq  Health Care Directive: No  Sexually Active: Yes     Current contraception: none  History of abnormal Pap smear: No  Family history of colon/breast/ovarian cancer: No  Regular self breast exam: Yes  History of abnormal mammogram: Yes: see reports      Labs reviewed in EPIC  BP Readings from Last 3 Encounters:   11/18/20 (!) 132/92   11/04/19 134/87   11/19/18 117/77    Wt Readings from Last 3 Encounters:   11/18/20 72.7 kg (160 lb 3.2 oz)   11/04/19 68.2 kg (150 lb 6.4 oz)   11/19/18 65.8 kg (145 lb)                  Patient Active Problem List   Diagnosis     CARDIOVASCULAR SCREENING; LDL GOAL LESS THAN 160     Menopausal syndrome (hot flashes)     Tobacco abuse     Herpes simplex labialis     Hypertriglyceridemia     Primary  osteoarthritis of both hands     Benign neoplasm of colon     Past Surgical History:   Procedure Laterality Date     CHOLECYSTECTOMY           COLONOSCOPY WITH CO2 INSUFFLATION N/A 2017    Procedure: COLONOSCOPY WITH CO2 INSUFFLATION;  COLON SCREEN/ MCGOWAN;  Surgeon: Nain Humphreys DO;  Location: [facility]     DILATION AND CURETTAGE       EXCISE MASS FINGER  5/15/2012    Procedure:EXCISE MASS FINGER; Left Middle Finger Mucinous Cyst Excision, Rotator Flap Distal Interphalangeal Debridement; Surgeon:MIGUELINA LUGO; Location:[facility]       Social History     Tobacco Use     Smoking status: Former Smoker     Packs/day: 0.00     Years: 20.00     Pack years: 0.00     Types: Cigarettes     Quit date: 10/21/2020     Years since quittin.0     Smokeless tobacco: Never Used     Tobacco comment: 2 cigarettes daily    Substance Use Topics     Alcohol use: No     Family History   Problem Relation Age of Onset     Neurologic Disorder Father          of MS      Asthma No family hx of      C.A.D. No family hx of      Diabetes No family hx of      Hypertension No family hx of      Cerebrovascular Disease No family hx of      Breast Cancer No family hx of      Cancer - colorectal No family hx of      Prostate Cancer No family hx of          Current Outpatient Medications   Medication Sig Dispense Refill     fenofibrate (TRICOR) 48 MG tablet Take 1 tablet (48 mg) by mouth daily 90 tablet 3     Fexofenadine HCl (ALLEGRA PO)        triamcinolone (KENALOG) 0.1 % external cream Apply topically 2 times daily for 14 days 45 g 3     valACYclovir (VALTREX) 1000 mg tablet 2 grams twice a day for 1 day 12 tablet 3       Mammogram Screening: Patient over age 50, mutual decision to screen reflected in health maintenance.    Pertinent mammograms are reviewed under the imaging tab.  Reviewed and updated as needed this visit by clinical staff  Tobacco  Allergies  Meds  Problems  Med Hx  Surg Hx  Fam Hx  Soc Hx       "    Reviewed and updated as needed this visit by Provider  Tobacco  Allergies  Meds  Problems  Med Hx  Surg Hx  Fam Hx             Review of Systems  CONSTITUTIONAL: NEGATIVE for fever, chills, change in weight  INTEGUMENTARY/SKIN: patch on each elbow with erythema, itch and plaque, comes and goes  EYES: NEGATIVE for vision changes or irritation  ENT: NEGATIVE for ear, mouth and throat problems  RESP: NEGATIVE for significant cough or SOB  BREAST: NEGATIVE for masses, tenderness or discharge  CV: NEGATIVE for chest pain, palpitations or peripheral edema  GI: NEGATIVE for nausea, abdominal pain, heartburn, or change in bowel habits  : NEGATIVE for unusual urinary or vaginal symptoms. No vaginal bleeding.  MUSCULOSKELETAL: NEGATIVE for significant arthralgias or myalgia  NEURO: NEGATIVE for weakness, dizziness or paresthesias  PSYCHIATRIC: NEGATIVE for changes in mood or affect      OBJECTIVE:   BP (!) 132/92   Pulse 97   Temp 96.6  F (35.9  C) (Tympanic)   Resp 18   Ht 1.575 m (5' 2\")   Wt 72.7 kg (160 lb 3.2 oz)   LMP 01/16/2016   SpO2 96%   BMI 29.30 kg/m    Physical Exam  GENERAL APPEARANCE: healthy, alert and no distress  EYES: Eyes grossly normal to inspection, PERRL and conjunctivae and sclerae normal  HENT: ear canals and TM's normal, nose and mouth without ulcers or lesions, oropharynx clear and oral mucous membranes moist  NECK: no adenopathy, no asymmetry, masses, or scars and thyroid normal to palpation  RESP: lungs clear to auscultation - no rales, rhonchi or wheezes  BREAST: normal without masses, tenderness or nipple discharge and no palpable axillary masses or adenopathy  CV: regular rate and rhythm, normal S1 S2, no S3 or S4, no murmur, click or rub, no peripheral edema and peripheral pulses strong  ABDOMEN: soft, nontender, no hepatosplenomegaly, no masses and bowel sounds normal  MS: no musculoskeletal defects are noted and gait is age appropriate without ataxia  SKIN: both elbows " "with small 1-2 cm plaque, erythema, fine white scale  NEURO: Normal strength and tone, sensory exam grossly normal, mentation intact and speech normal  PSYCH: mentation appears normal and affect normal/bright    Diagnostic Test Results:  Labs reviewed in Epic    ASSESSMENT/PLAN:   (Z00.00) Routine general medical examination at a health care facility  (primary encounter diagnosis)  Comment: preventive needs reviewed   Plan: see orders in Epic.     (E78.1) Hypertriglyceridemia  Comment: increased due to ordering out more  Plan: fenofibrate (TRICOR) 48 MG tablet        Refill x 1 yr, try to decrease fats and dairy.    (B00.1) Herpes simplex labialis  Comment: stable, episodic  Plan: valACYclovir (VALTREX) 1000 mg tablet        Refill x 1 yr     (L30.9) Eczema, unspecified type  Comment: possible eczema vs psoriasis  Plan: triamcinolone (KENALOG) 0.1 % external cream        No previous treatment, trial of topical steroid, if no change, plan punch bx.    (D12.6) Benign neoplasm of colon, unspecified part of colon  (Z12.11) Screen for colon cancer  Comment: due  Plan: GASTROENTEROLOGY ADULT REF PROCEDURE ONLY              Patient has been advised of split billing requirements and indicates understanding: Yes  COUNSELING:  Reviewed preventive health counseling, as reflected in patient instructions  Special attention given to:        Regular exercise       Healthy diet/nutrition    Estimated body mass index is 29.3 kg/m  as calculated from the following:    Height as of this encounter: 1.575 m (5' 2\").    Weight as of this encounter: 72.7 kg (160 lb 3.2 oz).    Weight management plan: Discussed healthy diet and exercise guidelines    She reports that she quit smoking about 4 weeks ago. Her smoking use included cigarettes. She smoked 0.00 packs per day for 20.00 years. She has never used smokeless tobacco.  Tobacco Cessation Action Plan:   has been smoke free x 4 weeks      Counseling Resources:  ATP IV Guidelines  Pooled " Cohorts Equation Calculator  Breast Cancer Risk Calculator  BRCA-Related Cancer Risk Assessment: FHS-7 Tool  FRAX Risk Assessment  ICSI Preventive Guidelines  Dietary Guidelines for Americans, 2010  USDA's MyPlate  ASA Prophylaxis  Lung CA Screening    Cathie Smith MD  [facility]

## 2020-11-09 NOTE — PROGRESS NOTES
Please review lab orders sign and close encounter. Guerita Albarran MA/DAVID    ? Physical appt 11/18/20

## 2020-11-09 NOTE — PROGRESS NOTES
.Please place or confirm pending lab orders for upcoming lab appointment on 11/11/20  Thank you,  Camille

## 2020-11-11 DIAGNOSIS — E78.1 HYPERTRIGLYCERIDEMIA: ICD-10-CM

## 2020-11-11 DIAGNOSIS — Z13.6 CARDIOVASCULAR SCREENING; LDL GOAL LESS THAN 160: ICD-10-CM

## 2020-11-11 DIAGNOSIS — Z00.00 ROUTINE HISTORY AND PHYSICAL EXAMINATION OF ADULT: ICD-10-CM

## 2020-11-11 DIAGNOSIS — Z13.1 SCREENING FOR DIABETES MELLITUS: ICD-10-CM

## 2020-11-11 LAB
CHOLEST SERPL-MCNC: 263 MG/DL
GLUCOSE SERPL-MCNC: 105 MG/DL (ref 70–99)
HDLC SERPL-MCNC: 46 MG/DL
LDLC SERPL CALC-MCNC: 154 MG/DL
NONHDLC SERPL-MCNC: 217 MG/DL
TRIGL SERPL-MCNC: 315 MG/DL

## 2020-11-11 PROCEDURE — 80061 LIPID PANEL: CPT | Performed by: FAMILY MEDICINE

## 2020-11-11 PROCEDURE — 82947 ASSAY GLUCOSE BLOOD QUANT: CPT | Performed by: FAMILY MEDICINE

## 2020-11-11 PROCEDURE — 36415 COLL VENOUS BLD VENIPUNCTURE: CPT | Performed by: FAMILY MEDICINE

## 2020-11-11 NOTE — RESULT ENCOUNTER NOTE
Tika,  Here are your lab results.  We will discuss these at your upcoming office or telephone visit.   See you soon.  Cathie Smith MD

## 2020-11-18 ENCOUNTER — OFFICE VISIT (OUTPATIENT)
Dept: FAMILY MEDICINE | Facility: CLINIC | Age: 55
End: 2020-11-18
Payer: COMMERCIAL

## 2020-11-18 VITALS
HEART RATE: 97 BPM | OXYGEN SATURATION: 96 % | BODY MASS INDEX: 29.48 KG/M2 | DIASTOLIC BLOOD PRESSURE: 92 MMHG | WEIGHT: 160.2 LBS | RESPIRATION RATE: 18 BRPM | TEMPERATURE: 96.6 F | SYSTOLIC BLOOD PRESSURE: 132 MMHG | HEIGHT: 62 IN

## 2020-11-18 DIAGNOSIS — L30.9 ECZEMA, UNSPECIFIED TYPE: ICD-10-CM

## 2020-11-18 DIAGNOSIS — Z00.00 ROUTINE GENERAL MEDICAL EXAMINATION AT A HEALTH CARE FACILITY: Primary | ICD-10-CM

## 2020-11-18 DIAGNOSIS — B00.1 HERPES SIMPLEX LABIALIS: ICD-10-CM

## 2020-11-18 DIAGNOSIS — E78.1 HYPERTRIGLYCERIDEMIA: ICD-10-CM

## 2020-11-18 DIAGNOSIS — Z12.11 SCREEN FOR COLON CANCER: ICD-10-CM

## 2020-11-18 DIAGNOSIS — D12.6 BENIGN NEOPLASM OF COLON, UNSPECIFIED PART OF COLON: ICD-10-CM

## 2020-11-18 PROCEDURE — 90471 IMMUNIZATION ADMIN: CPT | Performed by: FAMILY MEDICINE

## 2020-11-18 PROCEDURE — 99213 OFFICE O/P EST LOW 20 MIN: CPT | Mod: 25 | Performed by: FAMILY MEDICINE

## 2020-11-18 PROCEDURE — 90715 TDAP VACCINE 7 YRS/> IM: CPT | Performed by: FAMILY MEDICINE

## 2020-11-18 PROCEDURE — 99396 PREV VISIT EST AGE 40-64: CPT | Mod: 25 | Performed by: FAMILY MEDICINE

## 2020-11-18 RX ORDER — TRIAMCINOLONE ACETONIDE 1 MG/G
CREAM TOPICAL 2 TIMES DAILY
Qty: 45 G | Refills: 3 | Status: SHIPPED | OUTPATIENT
Start: 2020-11-18 | End: 2021-10-27

## 2020-11-18 RX ORDER — FENOFIBRATE 48 MG/1
48 TABLET, COATED ORAL DAILY
Qty: 90 TABLET | Refills: 3 | Status: SHIPPED | OUTPATIENT
Start: 2020-11-18 | End: 2021-10-27

## 2020-11-18 RX ORDER — VALACYCLOVIR HYDROCHLORIDE 1 G/1
TABLET, FILM COATED ORAL
Qty: 12 TABLET | Refills: 3 | Status: SHIPPED | OUTPATIENT
Start: 2020-11-18 | End: 2021-10-27

## 2020-11-18 ASSESSMENT — MIFFLIN-ST. JEOR: SCORE: 1274.91

## 2020-11-18 NOTE — NURSING NOTE
"Chief Complaint   Patient presents with     Physical       Initial BP (!) 132/92   Pulse 97   Temp 96.6  F (35.9  C) (Tympanic)   Resp 18   Ht 1.575 m (5' 2\")   Wt 72.7 kg (160 lb 3.2 oz)   LMP 01/16/2016   SpO2 96%   BMI 29.30 kg/m   Estimated body mass index is 29.3 kg/m  as calculated from the following:    Height as of this encounter: 1.575 m (5' 2\").    Weight as of this encounter: 72.7 kg (160 lb 3.2 oz).  Medication Reconciliation: complete  Omega Hall CMA    "

## 2020-11-18 NOTE — NURSING NOTE
Prior to injection verified patient identity using patient's name and date of birth.    Patient instructed to wait in clinic for 20 minutes following injection and to notify staff of any adverse reactions immediately.     Screening Questionnaire for Adult Immunization     Are you sick today?   No   Do you have allergies to medications, food or any vaccine?   Yes   Have you ever had a serious reaction after receiving a vaccination?   No   Do you have a long-term health problem with heart disease, lung disease,  asthma, kidney disease, diabetes, anemia, metabolic or blood disease?   No   Do you have cancer, leukemia, AIDS, or any immune system problem?   No   Do you take cortisone, prednisone, other steroids, or anticancer drugs, or  have you had any x-ray (radiation) treatments?   No   Have you had a seizure, brain, or other nervous system problem?   No   During the past year, have you received a transfusion of blood or blood       products, or been given a medicine called immune (gamma) globulin?   No   For women: Are you pregnant or is there a chance you could become         pregnant during the next month?   No   Have you received any vaccinations in the past 4 weeks?   No    Immunization questionnaire was positive for at least one answer.  Notified Dr. Smith .      MNV doesn't apply on this patient  Omega Hall, [facility]

## 2021-01-02 ENCOUNTER — ANCILLARY PROCEDURE (OUTPATIENT)
Dept: MAMMOGRAPHY | Facility: CLINIC | Age: 56
End: 2021-01-02
Payer: COMMERCIAL

## 2021-01-02 DIAGNOSIS — Z12.31 VISIT FOR SCREENING MAMMOGRAM: ICD-10-CM

## 2021-01-02 PROCEDURE — 77067 SCR MAMMO BI INCL CAD: CPT | Mod: TC | Performed by: RADIOLOGY

## 2021-01-27 ENCOUNTER — TRANSFERRED RECORDS (OUTPATIENT)
Dept: HEALTH INFORMATION MANAGEMENT | Facility: CLINIC | Age: 56
End: 2021-01-27

## 2021-10-02 ENCOUNTER — HEALTH MAINTENANCE LETTER (OUTPATIENT)
Age: 56
End: 2021-10-02

## 2021-10-08 ENCOUNTER — DOCUMENTATION ONLY (OUTPATIENT)
Dept: LAB | Facility: CLINIC | Age: 56
End: 2021-10-08

## 2021-10-08 DIAGNOSIS — E78.1 HYPERTRIGLYCERIDEMIA: Primary | ICD-10-CM

## 2021-10-08 NOTE — PROGRESS NOTES
Tika Mendoza has an upcoming lab appointment:    Future Appointments   Date Time Provider Department Center   10/15/2021  2:45 PM AN [facility]   10/27/2021  7:15 AM [facility]     Patient is scheduled for the following lab(s): lab    Patient either has no future order, or has Health Maintenance labs due. Please review and place either future orders or HMPO (Review of Health Maintenance Protocol Orders), as appropriate.    Health Maintenance Due   Topic     ANNUAL REVIEW OF HM ORDERS      Tiffany Jules

## 2021-10-18 NOTE — PROGRESS NOTES
"    Assessment & Plan     (E78.1) Hypertriglyceridemia  (primary encounter diagnosis)  Comment: improved  Plan: fenofibrate (TRICOR) 48 MG tablet        Refill x 1 yr     (R03.0) Elevated BP without diagnosis of hypertension  Comment: elevated today  Plan: return in 1 month for ancillary bp check  Reviewed lifestyle changes that influence bp  If needed, will start lisinopril. Discussed potential side effects and actions to take if they occur.     (L30.9) Eczema, unspecified type  Comment: needs refill  Plan: triamcinolone (KENALOG) 0.1 % external cream        Refill x 1 yr     (B00.1) Herpes simplex labialis  Comment: stable, episodic  Plan: valACYclovir (VALTREX) 1000 mg tablet        Refill x 1 yr     (D12.6) Benign neoplasm of colon, unspecified part of colon  Comment: due  Plan: Adult Gastro Ref - Procedure Only                        BMI:   Estimated body mass index is 29.74 kg/m  as calculated from the following:    Height as of this encounter: 1.575 m (5' 2\").    Weight as of this encounter: 73.8 kg (162 lb 9.6 oz).   Weight management plan: Discussed healthy diet and exercise guidelines    Patient Instructions       Return in 1 month for ancillary nurse visit for blood pressure check.      Return in about 4 weeks (around 11/24/2021) for Ancillary Nurse Visit, BP Recheck.    Cathie Smith MD  [facility] GIGI Villela is a 56 year old who presents for the following health issues     History of Present Illness       Hyperlipidemia:  She presents for follow up of hyperlipidemia.  She is taking medication to lower cholesterol. She is not having myalgia or other side effects to statin medications.    She eats 0-1 servings of fruits and vegetables daily.She consumes 2 sweetened beverage(s) daily.She exercises with enough effort to increase her heart rate 30 to 60 minutes per day.  She exercises with enough effort to increase her heart rate 5 days per week.   She is taking " medications regularly.         BP Readings from Last 3 Encounters:   10/27/21 (!) 144/88   20 (!) 132/92   19 134/87    Wt Readings from Last 3 Encounters:   10/27/21 73.8 kg (162 lb 9.6 oz)   20 72.7 kg (160 lb 3.2 oz)   19 68.2 kg (150 lb 6.4 oz)                  Patient Active Problem List   Diagnosis     CARDIOVASCULAR SCREENING; LDL GOAL LESS THAN 160     Menopausal syndrome (hot flashes)     Tobacco abuse     Herpes simplex labialis     Hypertriglyceridemia     Primary osteoarthritis of both hands     Benign neoplasm of colon     Past Surgical History:   Procedure Laterality Date     CHOLECYSTECTOMY           COLONOSCOPY WITH CO2 INSUFFLATION N/A 2017    Procedure: COLONOSCOPY WITH CO2 INSUFFLATION;  COLON SCREEN/ MCGOWAN;  Surgeon: Nain Humphreys DO;  Location: [facility] OR     DILATION AND CURETTAGE       EXCISE MASS FINGER  5/15/2012    Procedure:EXCISE MASS FINGER; Left Middle Finger Mucinous Cyst Excision, Rotator Flap Distal Interphalangeal Debridement; Surgeon:MIGUELINA LUGO; Location:[facility]       Social History     Tobacco Use     Smoking status: Former Smoker     Packs/day: 0.50     Years: 33.00     Pack years: 16.50     Types: Cigarettes     Start date: 10/14/1987     Quit date: 10/21/2020     Years since quittin.0     Smokeless tobacco: Never Used     Tobacco comment: 2 cigarettes daily    Substance Use Topics     Alcohol use: No     Family History   Problem Relation Age of Onset     Neurologic Disorder Father          of MS      Asthma No family hx of      C.A.D. No family hx of      Diabetes No family hx of      Hypertension No family hx of      Cerebrovascular Disease No family hx of      Breast Cancer No family hx of      Cancer - colorectal No family hx of      Prostate Cancer No family hx of          Current Outpatient Medications   Medication Sig Dispense Refill     fenofibrate (TRICOR) 48 MG tablet Take 1 tablet (48 mg) by mouth daily 90 tablet 3  "    Fexofenadine HCl (ALLEGRA PO)        triamcinolone (KENALOG) 0.1 % external cream Apply topically 2 times daily       triamcinolone (KENALOG) 0.1 % external cream Apply topically 2 times daily 45 g 3     valACYclovir (VALTREX) 1000 mg tablet 2 grams twice a day for 1 day 12 tablet 3     Recent Labs   Lab Test 10/19/21  1003 11/11/20  0853 10/28/19  0813 06/27/18  0711 12/20/17  0900   * 154* 125*   < > 129*   HDL 48* 46* 43*   < > 46*   TRIG 262* 315* 301*   < > 223*   ALT  --   --   --   --  26   CR 0.83  --   --   --   --    GFRESTIMATED 79  --   --   --   --    POTASSIUM 4.1  --   --   --   --     < > = values in this interval not displayed.         Review of Systems   Constitutional, HEENT, cardiovascular, pulmonary, gi and gu systems are negative, except as otherwise noted.      Objective    BP (!) 144/88   Pulse 78   Temp 98  F (36.7  C) (Oral)   Resp 20   Ht 1.575 m (5' 2\")   Wt 73.8 kg (162 lb 9.6 oz)   LMP 01/16/2016   SpO2 97%   BMI 29.74 kg/m    Body mass index is 29.74 kg/m .  Physical Exam   GENERAL: healthy, alert and no distress  EYES: Eyes grossly normal to inspection, PERRL and conjunctivae and sclerae normal  RESP: lungs clear to auscultation - no rales, rhonchi or wheezes  CV: regular rate and rhythm, normal S1 S2, no S3 or S4, no murmur, click or rub, no peripheral edema and peripheral pulses strong  MS: no gross musculoskeletal defects noted, no edema  SKIN: no suspicious lesions or rashes  PSYCH: mentation appears normal, affect normal/bright                "

## 2021-10-19 ENCOUNTER — LAB (OUTPATIENT)
Dept: LAB | Facility: CLINIC | Age: 56
End: 2021-10-19
Payer: COMMERCIAL

## 2021-10-19 DIAGNOSIS — E78.1 HYPERTRIGLYCERIDEMIA: ICD-10-CM

## 2021-10-19 LAB
ANION GAP SERPL CALCULATED.3IONS-SCNC: 6 MMOL/L (ref 3–14)
BUN SERPL-MCNC: 22 MG/DL (ref 7–30)
CALCIUM SERPL-MCNC: 9.2 MG/DL (ref 8.5–10.1)
CHLORIDE BLD-SCNC: 111 MMOL/L (ref 94–109)
CHOLEST SERPL-MCNC: 247 MG/DL
CO2 SERPL-SCNC: 25 MMOL/L (ref 20–32)
CREAT SERPL-MCNC: 0.83 MG/DL (ref 0.52–1.04)
FASTING STATUS PATIENT QL REPORTED: YES
GFR SERPL CREATININE-BSD FRML MDRD: 79 ML/MIN/1.73M2
GLUCOSE BLD-MCNC: 104 MG/DL (ref 70–99)
HDLC SERPL-MCNC: 48 MG/DL
LDLC SERPL CALC-MCNC: 147 MG/DL
NONHDLC SERPL-MCNC: 199 MG/DL
POTASSIUM BLD-SCNC: 4.1 MMOL/L (ref 3.4–5.3)
SODIUM SERPL-SCNC: 142 MMOL/L (ref 133–144)
TRIGL SERPL-MCNC: 262 MG/DL

## 2021-10-19 PROCEDURE — 80061 LIPID PANEL: CPT

## 2021-10-19 PROCEDURE — 36415 COLL VENOUS BLD VENIPUNCTURE: CPT

## 2021-10-19 PROCEDURE — 80048 BASIC METABOLIC PNL TOTAL CA: CPT

## 2021-10-27 ENCOUNTER — OFFICE VISIT (OUTPATIENT)
Dept: FAMILY MEDICINE | Facility: CLINIC | Age: 56
End: 2021-10-27
Payer: COMMERCIAL

## 2021-10-27 VITALS
DIASTOLIC BLOOD PRESSURE: 88 MMHG | RESPIRATION RATE: 20 BRPM | BODY MASS INDEX: 29.92 KG/M2 | OXYGEN SATURATION: 97 % | WEIGHT: 162.6 LBS | HEIGHT: 62 IN | TEMPERATURE: 98 F | SYSTOLIC BLOOD PRESSURE: 144 MMHG | HEART RATE: 78 BPM

## 2021-10-27 DIAGNOSIS — E78.1 HYPERTRIGLYCERIDEMIA: Primary | ICD-10-CM

## 2021-10-27 DIAGNOSIS — R03.0 ELEVATED BP WITHOUT DIAGNOSIS OF HYPERTENSION: ICD-10-CM

## 2021-10-27 DIAGNOSIS — B00.1 HERPES SIMPLEX LABIALIS: ICD-10-CM

## 2021-10-27 DIAGNOSIS — L30.9 ECZEMA, UNSPECIFIED TYPE: ICD-10-CM

## 2021-10-27 DIAGNOSIS — D12.6 BENIGN NEOPLASM OF COLON, UNSPECIFIED PART OF COLON: ICD-10-CM

## 2021-10-27 PROCEDURE — 99214 OFFICE O/P EST MOD 30 MIN: CPT | Performed by: FAMILY MEDICINE

## 2021-10-27 RX ORDER — TRIAMCINOLONE ACETONIDE 1 MG/G
CREAM TOPICAL 2 TIMES DAILY
Qty: 45 G | Refills: 3 | Status: SHIPPED | OUTPATIENT
Start: 2021-10-27 | End: 2022-10-13

## 2021-10-27 RX ORDER — FENOFIBRATE 48 MG/1
48 TABLET, COATED ORAL DAILY
Qty: 90 TABLET | Refills: 3 | Status: SHIPPED | OUTPATIENT
Start: 2021-10-27 | End: 2022-10-13

## 2021-10-27 RX ORDER — VALACYCLOVIR HYDROCHLORIDE 1 G/1
TABLET, FILM COATED ORAL
Qty: 12 TABLET | Refills: 3 | Status: SHIPPED | OUTPATIENT
Start: 2021-10-27 | End: 2022-10-13

## 2021-10-27 RX ORDER — TRIAMCINOLONE ACETONIDE 1 MG/G
CREAM TOPICAL 2 TIMES DAILY
COMMUNITY
End: 2022-05-02

## 2021-10-27 ASSESSMENT — PAIN SCALES - GENERAL: PAINLEVEL: NO PAIN (0)

## 2021-10-27 ASSESSMENT — MIFFLIN-ST. JEOR: SCORE: 1280.8

## 2021-10-27 NOTE — NURSING NOTE
"  Chief Complaint   Patient presents with     Lipids       Initial BP (!) 155/89   Pulse 78   Temp 98  F (36.7  C) (Oral)   Resp 20   Ht 1.575 m (5' 2\")   Wt 73.8 kg (162 lb 9.6 oz)   LMP 01/16/2016   SpO2 97%   BMI 29.74 kg/m   Estimated body mass index is 29.74 kg/m  as calculated from the following:    Height as of this encounter: 1.575 m (5' 2\").    Weight as of this encounter: 73.8 kg (162 lb 9.6 oz).  Medication Reconciliation: complete  Omega Hall CMA    "

## 2021-12-02 ENCOUNTER — ALLIED HEALTH/NURSE VISIT (OUTPATIENT)
Dept: FAMILY MEDICINE | Facility: CLINIC | Age: 56
End: 2021-12-02
Payer: COMMERCIAL

## 2021-12-02 VITALS — DIASTOLIC BLOOD PRESSURE: 81 MMHG | HEART RATE: 86 BPM | SYSTOLIC BLOOD PRESSURE: 130 MMHG

## 2021-12-02 DIAGNOSIS — Z01.30 BP CHECK: Primary | ICD-10-CM

## 2021-12-02 PROCEDURE — 99207 PR NO CHARGE NURSE ONLY: CPT

## 2021-12-02 NOTE — PROGRESS NOTES
SUBJECTIVE:  Tika Mendoza is an 56 year old female who presents for a blood pressure check.      **Per pt wanted to check her own at home BP machine. [place] was BP: 154/109 P: 87. Discuss what to do next, pt was also interested on starting BP med and would like to start with hydrochlorothiazide due to hearing from others that med works well with them and cheaper. Sent to PCP to advised. Dionte Rapp MA    The reason for the visit is:  an elevated blood pressure was noted elsewhere and they were told to come for a recheck    The patient reports that she IS NOT taking the medication as prescribed.     Current Outpatient Medications   Medication     fenofibrate (TRICOR) 48 MG tablet     Fexofenadine HCl (ALLEGRA PO)     triamcinolone (KENALOG) 0.1 % external cream     triamcinolone (KENALOG) 0.1 % external cream     valACYclovir (VALTREX) 1000 mg tablet     No current facility-administered medications for this visit.       Allergies   Allergen Reactions     Tolmetin Anaphylaxis     Sulfa Drugs      Severe headaches     Strawberry Rash         OBJECTIVE:  Please get a blood pressure AND a pulse.  A height is also needed if has not been done in the past year.    /81   Pulse 86   LMP 01/16/2016         If patient has diagnosis of HYPERTENSION, is there a goal on the problem list? No  Patient Active Problem List   Diagnosis     CARDIOVASCULAR SCREENING; LDL GOAL LESS THAN 160     Menopausal syndrome (hot flashes)     Tobacco abuse     Herpes simplex labialis     Hypertriglyceridemia     Primary osteoarthritis of both hands     Benign neoplasm of colon       Plan:    Systolic BP    Greater than or equal to 100  OR Less than  140    Diastolic BP    Greater than or equal to 70 OR less than 90    Pulse    Pulse greater than 60 or less than 100      If all the above three parameters are met, patient to go home. Chart CC to Primary Care Provider  If any one of the above three parameters is not met notify RN or  provider.

## 2021-12-22 ENCOUNTER — TELEPHONE (OUTPATIENT)
Dept: GASTROENTEROLOGY | Facility: CLINIC | Age: 56
End: 2021-12-22
Payer: COMMERCIAL

## 2021-12-22 NOTE — TELEPHONE ENCOUNTER
Screening Questions  1. Are you active on mychart? YES    2. What insurance is in the chart? BCBS    2.  Ordering/Referring Provider: Cathie Smith MD in [facility]    3. BMI 27.4, If greater than 40 review exclusion criteria also will need EXTENDED PREP    4.  Respiratory Screening (If yes to any of the following Hospital setting only):     Do you use daily home oxygen? N  Do you have mod to severe Obstructive Sleep Apnea? N   Do you have Pulmonary Hypertension? N   Do you have UNCONTROLLED asthma? N    5. Have you had a heart or lung transplant (If yes, please review exclusion criteria) ? N    6. Are you currently on dialysis or have chronic kidney disease? N    7. Have you had a stroke or Transient ischemic attack (TIA) within 6 months? N    8. In the past 6 months, have you had any heart related issues including cardiomyopathy or heart attack? N                 If yes, did it require cardiac stenting or other implantable device?N      9. Do you have any implantable devices in your body (pacemaker, defib, LVAD)? N    10. Do you take nitroglycerin? If yes, how often? N    11. Are you currently taking any blood thinners?N    12. Are you a diabetic? N    13. (Females) Are you currently pregnant? N  If yes, how many weeks?      15. Are you taking any prescription pain medications on a routine schedule? N If yes, MAC sedation and patient will need EXTENDED PREP.    16. Do you have any chemical dependencies such as alcohol, street drugs, or methadone? NIf yes, MAC sedation.    17. Do you have any history of post-traumatic stress syndrome, severe anxiety or history of psychosis? N    18. Do you transfer independently? YES    19.  Do you have any issues with constipation? N   If yes, pt will need EXTENDED PREP     20. Preferred Pharmacy for Pre Prescription [facility]     Scheduling Details    Which Colonoscopy Prep was Sent?: MIRALAX   Procedure Scheduled: COLONOSCOPY   Surgeon:   CRISTI  Date of Procedure: 01/24/2022  Location:   Caller (Please ask for phone number if not scheduled by patient): Tika Mendoza      Sedation Type: CS  Conscious Sedation- Needs  for 6 hours after the procedure  MAC/General-Needs  for 24 hours after procedure    Pre-op Required at [facility], [place], [place] and OR for MAC sedation: N  (if yes advise patient they will need a pre-op prior to procedure)      Is patient on blood thinners? -N (If yes- inform patient to follow up with PCP or provider for follow up instructions)     Informed patient they will need an adult  Y  Cannot take any type of public or medical transportation alone    Pre-Procedure Covid test to be completed at [facility] or Externally: SCHEDULED    Confirmed Nurse will call to complete assessment Y    Additional comments: N

## 2021-12-25 DIAGNOSIS — Z11.59 ENCOUNTER FOR SCREENING FOR OTHER VIRAL DISEASES: ICD-10-CM

## 2022-01-20 ENCOUNTER — LAB (OUTPATIENT)
Dept: LAB | Facility: CLINIC | Age: 57
End: 2022-01-20
Payer: COMMERCIAL

## 2022-01-20 DIAGNOSIS — Z11.59 ENCOUNTER FOR SCREENING FOR OTHER VIRAL DISEASES: ICD-10-CM

## 2022-01-20 PROCEDURE — U0005 INFEC AGEN DETEC AMPLI PROBE: HCPCS

## 2022-01-20 PROCEDURE — U0003 INFECTIOUS AGENT DETECTION BY NUCLEIC ACID (DNA OR RNA); SEVERE ACUTE RESPIRATORY SYNDROME CORONAVIRUS 2 (SARS-COV-2) (CORONAVIRUS DISEASE [COVID-19]), AMPLIFIED PROBE TECHNIQUE, MAKING USE OF HIGH THROUGHPUT TECHNOLOGIES AS DESCRIBED BY CMS-2020-01-R: HCPCS

## 2022-01-21 LAB — SARS-COV-2 RNA RESP QL NAA+PROBE: NEGATIVE

## 2022-01-24 ENCOUNTER — HOSPITAL ENCOUNTER (OUTPATIENT)
Facility: AMBULATORY SURGERY CENTER | Age: 57
Discharge: HOME | End: 2022-01-24
Attending: SURGERY | Admitting: SURGERY
Payer: COMMERCIAL

## 2022-01-24 VITALS
TEMPERATURE: 97.1 F | RESPIRATION RATE: 16 BRPM | DIASTOLIC BLOOD PRESSURE: 72 MMHG | OXYGEN SATURATION: 98 % | SYSTOLIC BLOOD PRESSURE: 149 MMHG

## 2022-01-24 DIAGNOSIS — D12.6 BENIGN NEOPLASM OF COLON, UNSPECIFIED PART OF COLON: Primary | ICD-10-CM

## 2022-01-24 LAB — COLONOSCOPY: NORMAL

## 2022-01-24 PROCEDURE — G8918 PT W/O PREOP ORDER IV AB PRO: HCPCS

## 2022-01-24 PROCEDURE — G8907 PT DOC NO EVENTS ON DISCHARG: HCPCS

## 2022-01-24 PROCEDURE — 88305 TISSUE EXAM BY PATHOLOGIST: CPT | Mod: GC | Performed by: PATHOLOGY

## 2022-01-24 PROCEDURE — 45380 COLONOSCOPY AND BIOPSY: CPT

## 2022-01-24 RX ORDER — PROCHLORPERAZINE MALEATE 10 MG
10 TABLET ORAL EVERY 6 HOURS PRN
Status: CANCELLED | OUTPATIENT
Start: 2022-01-24

## 2022-01-24 RX ORDER — NALOXONE HYDROCHLORIDE 0.4 MG/ML
0.4 INJECTION, SOLUTION INTRAMUSCULAR; INTRAVENOUS; SUBCUTANEOUS
Status: CANCELLED | OUTPATIENT
Start: 2022-01-24

## 2022-01-24 RX ORDER — LIDOCAINE 40 MG/G
CREAM TOPICAL
Status: DISCONTINUED | OUTPATIENT
Start: 2022-01-24 | End: 2022-01-25 | Stop reason: HOSPADM

## 2022-01-24 RX ORDER — FENTANYL CITRATE 50 UG/ML
INJECTION, SOLUTION INTRAMUSCULAR; INTRAVENOUS PRN
Status: DISCONTINUED | OUTPATIENT
Start: 2022-01-24 | End: 2022-01-24 | Stop reason: HOSPADM

## 2022-01-24 RX ORDER — ONDANSETRON 2 MG/ML
4 INJECTION INTRAMUSCULAR; INTRAVENOUS EVERY 6 HOURS PRN
Status: CANCELLED | OUTPATIENT
Start: 2022-01-24

## 2022-01-24 RX ORDER — ONDANSETRON 2 MG/ML
4 INJECTION INTRAMUSCULAR; INTRAVENOUS
Status: DISCONTINUED | OUTPATIENT
Start: 2022-01-24 | End: 2022-01-25 | Stop reason: HOSPADM

## 2022-01-24 RX ORDER — ONDANSETRON 4 MG/1
4 TABLET, ORALLY DISINTEGRATING ORAL EVERY 6 HOURS PRN
Status: CANCELLED | OUTPATIENT
Start: 2022-01-24

## 2022-01-24 RX ORDER — FLUMAZENIL 0.1 MG/ML
0.2 INJECTION, SOLUTION INTRAVENOUS
Status: CANCELLED | OUTPATIENT
Start: 2022-01-24 | End: 2022-01-24

## 2022-01-24 RX ORDER — NALOXONE HYDROCHLORIDE 0.4 MG/ML
0.2 INJECTION, SOLUTION INTRAMUSCULAR; INTRAVENOUS; SUBCUTANEOUS
Status: CANCELLED | OUTPATIENT
Start: 2022-01-24

## 2022-01-26 LAB
PATH REPORT.COMMENTS IMP SPEC: NORMAL
PATH REPORT.COMMENTS IMP SPEC: NORMAL
PATH REPORT.FINAL DX SPEC: NORMAL
PATH REPORT.GROSS SPEC: NORMAL
PATH REPORT.MICROSCOPIC SPEC OTHER STN: NORMAL
PATH REPORT.RELEVANT HX SPEC: NORMAL
PHOTO IMAGE: NORMAL

## 2022-02-24 ENCOUNTER — ANCILLARY PROCEDURE (OUTPATIENT)
Dept: MAMMOGRAPHY | Facility: CLINIC | Age: 57
End: 2022-02-24
Payer: COMMERCIAL

## 2022-02-24 DIAGNOSIS — Z12.31 VISIT FOR SCREENING MAMMOGRAM: ICD-10-CM

## 2022-02-24 PROCEDURE — 77067 SCR MAMMO BI INCL CAD: CPT | Mod: TC | Performed by: RADIOLOGY

## 2022-05-02 ENCOUNTER — TELEPHONE (OUTPATIENT)
Dept: FAMILY MEDICINE | Facility: CLINIC | Age: 57
End: 2022-05-02
Payer: COMMERCIAL

## 2022-05-02 DIAGNOSIS — E78.1 HYPERTRIGLYCERIDEMIA: Primary | ICD-10-CM

## 2022-05-02 NOTE — TELEPHONE ENCOUNTER
Reason for Call: Request for an order or referral:    Order or referral being requested: Fasting labs    Date needed: before my next appointment    Has the patient been seen by the PCP for this problem? YES    Additional comments: Tika requested to have fasting labs prior to her preventative visit with Dr. Smith. Please input these orders prior to her appointment date, which is 10/6/2022    Phone number Patient can be reached at:  Cell number on file:    Telephone Information:   Mobile [phone]       Best Time:  Any time    Can we leave a detailed message on this number?  YES    Call taken on 5/2/2022 at 10:53 AM by Merly Quinones

## 2022-08-24 NOTE — RESULT ENCOUNTER NOTE
Addressed at clinic visit.  
Tika,  Here are your lab results.  We will discuss these at your upcoming visit.   See you soon.  Cathie Smith MD
impaired balance/pain/decreased strength

## 2022-09-26 ENCOUNTER — LAB (OUTPATIENT)
Dept: LAB | Facility: CLINIC | Age: 57
End: 2022-09-26
Payer: COMMERCIAL

## 2022-09-26 DIAGNOSIS — E78.1 HYPERTRIGLYCERIDEMIA: ICD-10-CM

## 2022-09-26 LAB
ALBUMIN SERPL-MCNC: 4 G/DL (ref 3.4–5)
ALP SERPL-CCNC: 89 U/L (ref 40–150)
ALT SERPL W P-5'-P-CCNC: 40 U/L (ref 0–50)
ANION GAP SERPL CALCULATED.3IONS-SCNC: 7 MMOL/L (ref 3–14)
AST SERPL W P-5'-P-CCNC: 26 U/L (ref 0–45)
BILIRUB SERPL-MCNC: 0.3 MG/DL (ref 0.2–1.3)
BUN SERPL-MCNC: 20 MG/DL (ref 7–30)
CALCIUM SERPL-MCNC: 9.4 MG/DL (ref 8.5–10.1)
CHLORIDE BLD-SCNC: 109 MMOL/L (ref 94–109)
CHOLEST SERPL-MCNC: 184 MG/DL
CO2 SERPL-SCNC: 24 MMOL/L (ref 20–32)
CREAT SERPL-MCNC: 0.86 MG/DL (ref 0.52–1.04)
FASTING STATUS PATIENT QL REPORTED: YES
GFR SERPL CREATININE-BSD FRML MDRD: 79 ML/MIN/1.73M2
GLUCOSE BLD-MCNC: 104 MG/DL (ref 70–99)
HDLC SERPL-MCNC: 50 MG/DL
LDLC SERPL CALC-MCNC: 96 MG/DL
NONHDLC SERPL-MCNC: 134 MG/DL
POTASSIUM BLD-SCNC: 3.8 MMOL/L (ref 3.4–5.3)
PROT SERPL-MCNC: 7.3 G/DL (ref 6.8–8.8)
SODIUM SERPL-SCNC: 140 MMOL/L (ref 133–144)
TRIGL SERPL-MCNC: 192 MG/DL

## 2022-09-26 PROCEDURE — 80053 COMPREHEN METABOLIC PANEL: CPT

## 2022-09-26 PROCEDURE — 36415 COLL VENOUS BLD VENIPUNCTURE: CPT

## 2022-09-26 PROCEDURE — 80061 LIPID PANEL: CPT

## 2022-10-08 ASSESSMENT — ENCOUNTER SYMPTOMS
PARESTHESIAS: 0
JOINT SWELLING: 0
CONSTIPATION: 0
DIARRHEA: 0
ABDOMINAL PAIN: 0
FEVER: 0
FREQUENCY: 0
HEADACHES: 1
BREAST MASS: 0
NAUSEA: 0
ARTHRALGIAS: 1
EYE PAIN: 0
CHILLS: 0
COUGH: 0
HEMATURIA: 0
HEMATOCHEZIA: 0
NERVOUS/ANXIOUS: 0
PALPITATIONS: 0
SHORTNESS OF BREATH: 0
HEARTBURN: 1
SORE THROAT: 0
DIZZINESS: 0
DYSURIA: 0
MYALGIAS: 1
WEAKNESS: 0

## 2022-10-11 ASSESSMENT — ENCOUNTER SYMPTOMS
COUGH: 0
DIARRHEA: 0
HEADACHES: 1
HEMATOCHEZIA: 0
FREQUENCY: 0
WEAKNESS: 0
CONSTIPATION: 0
EYE PAIN: 0
HEMATURIA: 0
ARTHRALGIAS: 1
FEVER: 0
DYSURIA: 0
HEARTBURN: 1
BREAST MASS: 0
PALPITATIONS: 0
SHORTNESS OF BREATH: 0
MYALGIAS: 1
PARESTHESIAS: 0
ABDOMINAL PAIN: 0
CHILLS: 0
NAUSEA: 0
SORE THROAT: 0
DIZZINESS: 0
NERVOUS/ANXIOUS: 0
JOINT SWELLING: 0

## 2022-10-11 NOTE — PATIENT INSTRUCTIONS
Consider TwinRix (Hep A/HepB) vaccination.    Ok to consider stopping fenofibrate for 6 weeks to check if still needed. WANTED Technologieshart message when ready to do that.      Preventive Health Recommendations  Female Ages 50 - 64    Yearly exam: See your health care provider every year in order to  Review health changes.   Discuss preventive care.    Review your medicines if your doctor has prescribed any.    Get a Pap test every three years (unless you have an abnormal result and your provider advises testing more often).  If you get Pap tests with HPV test, you only need to test every 5 years, unless you have an abnormal result.   You do not need a Pap test if your uterus was removed (hysterectomy) and you have not had cancer.  You should be tested each year for STDs (sexually transmitted diseases) if you're at risk.   Have a mammogram every 1 to 2 years.  Have a colonoscopy at age 50, or have a yearly FIT test (stool test). These exams screen for colon cancer.    Have a cholesterol test every 5 years, or more often if advised.  Have a diabetes test (fasting glucose) every three years. If you are at risk for diabetes, you should have this test more often.   If you are at risk for osteoporosis (brittle bone disease), think about having a bone density scan (DEXA).    Shots: Get a flu shot each year. Get a tetanus shot every 10 years.    Nutrition:   Eat at least 5 servings of fruits and vegetables each day.  Eat whole-grain bread, whole-wheat pasta and brown rice instead of white grains and rice.  Get adequate Calcium and Vitamin D.     Lifestyle  Exercise at least 150 minutes a week (30 minutes a day, 5 days a week). This will help you control your weight and prevent disease.  Limit alcohol to one drink per day.  No smoking.   Wear sunscreen to prevent skin cancer.   See your dentist every six months for an exam and cleaning.  See your eye doctor every 1 to 2 years.

## 2022-10-11 NOTE — PROGRESS NOTES
SUBJECTIVE:   CC: Tika is an 56 year old who presents for preventive health visit.       Patient has been advised of split billing requirements and indicates understanding: Yes  Healthy Habits:     Getting at least 3 servings of Calcium per day:  Yes    Bi-annual eye exam:  Yes    Dental care twice a year:  Yes    Sleep apnea or symptoms of sleep apnea:  None    Diet:  Low fat/cholesterol    Frequency of exercise:  4-5 days/week    Duration of exercise:  30-45 minutes    Taking medications regularly:  Yes    Medication side effects:  None    PHQ-2 Total Score: 0    Additional concerns today:  Yes              Today's PHQ-2 Score:   PHQ-2 (  [employer]) 10/8/2022   Q1: Little interest or pleasure in doing things 0   Q2: Feeling down, depressed or hopeless 0   PHQ-2 Score 0   PHQ-2 Total Score (12-17 Years)- Positive if 3 or more points; Administer PHQ-A if positive -   Q1: Little interest or pleasure in doing things Not at all   Q2: Feeling down, depressed or hopeless Not at all   PHQ-2 Score 0       Abuse: Current or Past (Physical, Sexual or Emotional) - No  Do you feel safe in your environment? Yes        Social History     Tobacco Use     Smoking status: Former     Packs/day: 0.50     Years: 33.00     Pack years: 16.50     Types: Cigarettes     Start date: 10/14/1987     Quit date: 10/21/2020     Years since quittin.9     Smokeless tobacco: Never     Tobacco comments:     2 cigarettes daily    Substance Use Topics     Alcohol use: No         Alcohol Use 10/8/2022   Prescreen: >3 drinks/day or >7 drinks/week? No   Prescreen: >3 drinks/day or >7 drinks/week? -       Reviewed orders with patient.  Reviewed health maintenance and updated orders accordingly - Yes    G 0 P 0   Patient's last menstrual period was 2016.     Fasting: No   Td: tdap 2020       Flu: 10/2021      Covid: Pfboost#2 2022      Shingrix: done      PPV: pcv 23      Last 3 Pap and HPV Results:   PAP / HPV Latest Ref Rng & Units  2017   PAP (Historical) - NIL OTHER-NIL EM>40   HPV16 NEG:Negative Negative -   HPV18 NEG:Negative Negative -   HRHPV NEG:Negative Negative -                  Cholesterol:   Lab Results   Component Value Date    CHOL 184 2022    CHOL 263 2020     Lab Results   Component Value Date    HDL 50 2022    HDL 46 2020     Lab Results   Component Value Date    LDL 96 2022     2020     Lab Results   Component Value Date    TRIG 192 2022    TRIG 315 2020     Lab Results   Component Value Date    CHOLHDLRATIO 5.9 2013         MM2022  Dexa:  NA     Flex/colo: 2022 q7y scope      Seat Belt: Yes    Sunscreen use: Yes   Calcium Intake: adeq  Health Care Directive: No  Sexually Active: Yes     Current contraception: none  History of abnormal Pap smear: No  Family history of colon/breast/ovarian cancer: No  Regular self breast exam: Yes  History of abnormal mammogram: Yes: see reports      Labs reviewed in EPIC  BP Readings from Last 3 Encounters:   10/13/22 118/72   22 (!) 149/72   21 130/81    Wt Readings from Last 3 Encounters:   10/13/22 67 kg (147 lb 12.8 oz)   10/27/21 73.8 kg (162 lb 9.6 oz)   20 72.7 kg (160 lb 3.2 oz)                  Patient Active Problem List   Diagnosis     CARDIOVASCULAR SCREENING; LDL GOAL LESS THAN 160     Menopausal syndrome (hot flashes)     Tobacco abuse     Herpes simplex labialis     Hypertriglyceridemia     Primary osteoarthritis of both hands     Benign neoplasm of colon     Past Surgical History:   Procedure Laterality Date     CHOLECYSTECTOMY           COLONOSCOPY N/A 2022    Procedure: COLONOSCOPY, WITH POLYPECTOMY AND BIOPSY;  Surgeon: Morena Olivo MD;  Location: [facility] OR     COLONOSCOPY WITH CO2 INSUFFLATION N/A 2017    Procedure: COLONOSCOPY WITH CO2 INSUFFLATION;  COLON SCREEN/ MCGOWAN;  Surgeon: Nain Humphreys DO;  Location: [facility] OR     COLONOSCOPY WITH CO2  INSUFFLATION N/A 2022    Procedure: COLONOSCOPY, WITH CO2 INSUFFLATION;  Surgeon: Morena Olivo MD;  Location: [facility] OR     DILATION AND CURETTAGE       EXCISE MASS FINGER  5/15/2012    Procedure:EXCISE MASS FINGER; Left Middle Finger Mucinous Cyst Excision, Rotator Flap Distal Interphalangeal Debridement; Surgeon:MIGUELINA LUGO; Location:[facility] OR       Social History     Tobacco Use     Smoking status: Former     Packs/day: 0.50     Years: 33.00     Pack years: 16.50     Types: Cigarettes     Start date: 10/14/1987     Quit date: 10/21/2020     Years since quittin.9     Smokeless tobacco: Never     Tobacco comments:     2 cigarettes daily    Substance Use Topics     Alcohol use: No     Family History   Problem Relation Age of Onset     Neurologic Disorder Father          of MS      Other Cancer Other      Substance Abuse Brother      Asthma No family hx of      C.A.D. No family hx of      Diabetes No family hx of      Hypertension No family hx of      Cerebrovascular Disease No family hx of      Breast Cancer No family hx of      Cancer - colorectal No family hx of      Prostate Cancer No family hx of          Current Outpatient Medications   Medication Sig Dispense Refill     fenofibrate (TRICOR) 48 MG tablet Take 1 tablet (48 mg) by mouth daily 90 tablet 3     Fexofenadine HCl (ALLEGRA PO)        triamcinolone (KENALOG) 0.1 % external cream Apply topically 2 times daily 45 g 3     valACYclovir (VALTREX) 1000 mg tablet 2 grams twice a day for 1 day 12 tablet 3       Breast Cancer Screening:    Breast CA Risk Assessment (FHS-7) 10/8/2022   Do you have a family history of breast, colon, or ovarian cancer? No / Unknown         Mammogram Screening: Recommended mammography every 1-2 years with patient discussion and risk factor consideration  Pertinent mammograms are reviewed under the imaging tab.      Reviewed and updated as needed this visit by clinical staff   Tobacco  Allergies  Meds   "Problems  Med Hx  Surg Hx  Fam Hx          Reviewed and updated as needed this visit by Provider   Tobacco  Allergies  Meds  Problems  Med Hx  Surg Hx  Fam Hx             Review of Systems   Constitutional: Negative for chills and fever.   HENT: Negative for congestion, ear pain, hearing loss and sore throat.    Eyes: Negative for pain and visual disturbance.   Respiratory: Negative for cough and shortness of breath.    Cardiovascular: Negative for chest pain, palpitations and peripheral edema.   Gastrointestinal: Positive for heartburn. Negative for abdominal pain, constipation, diarrhea, hematochezia and nausea.   Breasts:  Negative for tenderness, breast mass and discharge.   Genitourinary: Negative for dysuria, frequency, genital sores, hematuria, pelvic pain, urgency, vaginal bleeding and vaginal discharge.   Musculoskeletal: Positive for arthralgias and myalgias. Negative for joint swelling.   Skin: Negative for rash.   Neurological: Positive for headaches. Negative for dizziness, weakness and paresthesias.   Psychiatric/Behavioral: Negative for mood changes. The patient is not nervous/anxious.           OBJECTIVE:   /72   Pulse 88   Temp 96.8  F (36  C) (Tympanic)   Resp 16   Ht 1.575 m (5' 2\")   Wt 67 kg (147 lb 12.8 oz)   LMP 01/16/2016   SpO2 98%   BMI 27.03 kg/m    Physical Exam  GENERAL APPEARANCE: healthy, alert and no distress  EYES: Eyes grossly normal to inspection, PERRL and conjunctivae and sclerae normal  HENT: ear canals and TM's normal, nose and mouth without ulcers or lesions, oropharynx clear and oral mucous membranes moist  NECK: no adenopathy, no asymmetry, masses, or scars and thyroid normal to palpation  RESP: lungs clear to auscultation - no rales, rhonchi or wheezes  BREAST: normal without masses, tenderness or nipple discharge and no palpable axillary masses or adenopathy  CV: regular rate and rhythm, normal S1 S2, no S3 or S4, no murmur, click or rub, no " "peripheral edema and peripheral pulses strong  ABDOMEN: soft, nontender, no hepatosplenomegaly, no masses and bowel sounds normal   (female): normal female external genitalia, normal urethral meatus, vaginal mucosal atrophy noted, normal cervix, adnexae, and uterus without masses or abnormal discharge  MS: no musculoskeletal defects are noted and gait is age appropriate without ataxia  SKIN: no suspicious lesions or rashes  NEURO: Normal strength and tone, sensory exam grossly normal, mentation intact and speech normal  PSYCH: mentation appears normal and affect normal/bright    Diagnostic Test Results:  Labs reviewed in Epic    ASSESSMENT/PLAN:   (Z00.00) Routine general medical examination at a health care facility  (primary encounter diagnosis)  Comment: preventive needs reviewed   Plan: Pap Screen with HPV - recommended age 30 - 65         years        see orders in Epic.   Wt loss on Go-Lo - much better eating.    (E78.1) Hypertriglyceridemia  Comment: down due to wt loss and better eating  Plan: fenofibrate (TRICOR) 48 MG tablet, OFFICE/OUTPT        VISIT,EST,LEVL IV        Refill x 1 yr consider trial off to see if needed.    (B00.1) Herpes simplex labialis  Comment: episodic flares  Plan: valACYclovir (VALTREX) 1000 mg tablet,         OFFICE/OUTPT VISIT,EST,LEVL IV        Refill x 1 yr     (L30.9) Eczema, unspecified type  Comment: controlled  Plan: triamcinolone (KENALOG) 0.1 % external cream,         OFFICE/OUTPT VISIT,EST,LEVL IV        Refill x 1 yr           COUNSELING:  Reviewed preventive health counseling, as reflected in patient instructions  Special attention given to:        Regular exercise       Healthy diet/nutrition    Estimated body mass index is 27.03 kg/m  as calculated from the following:    Height as of this encounter: 1.575 m (5' 2\").    Weight as of this encounter: 67 kg (147 lb 12.8 oz).    Weight management plan: Discussed healthy diet and exercise guidelines    She reports that she " quit smoking about 1 years ago. Her smoking use included cigarettes. She started smoking about 35 years ago. She has a 16.50 pack-year smoking history. She has never used smokeless tobacco.      Counseling Resources:  ATP IV Guidelines  Pooled Cohorts Equation Calculator  Breast Cancer Risk Calculator  BRCA-Related Cancer Risk Assessment: FHS-7 Tool  FRAX Risk Assessment  ICSI Preventive Guidelines  Dietary Guidelines for Americans, 2010  USDA's MyPlate  ASA Prophylaxis  Lung CA Screening    Cathie Smith MD  [facility]

## 2022-10-13 ENCOUNTER — OFFICE VISIT (OUTPATIENT)
Dept: FAMILY MEDICINE | Facility: CLINIC | Age: 57
End: 2022-10-13
Payer: COMMERCIAL

## 2022-10-13 VITALS
TEMPERATURE: 96.8 F | RESPIRATION RATE: 16 BRPM | BODY MASS INDEX: 27.2 KG/M2 | HEIGHT: 62 IN | WEIGHT: 147.8 LBS | OXYGEN SATURATION: 98 % | HEART RATE: 88 BPM | SYSTOLIC BLOOD PRESSURE: 118 MMHG | DIASTOLIC BLOOD PRESSURE: 72 MMHG

## 2022-10-13 DIAGNOSIS — L30.9 ECZEMA, UNSPECIFIED TYPE: ICD-10-CM

## 2022-10-13 DIAGNOSIS — E78.1 HYPERTRIGLYCERIDEMIA: ICD-10-CM

## 2022-10-13 DIAGNOSIS — Z00.00 ROUTINE GENERAL MEDICAL EXAMINATION AT A HEALTH CARE FACILITY: Primary | ICD-10-CM

## 2022-10-13 DIAGNOSIS — B00.1 HERPES SIMPLEX LABIALIS: ICD-10-CM

## 2022-10-13 PROCEDURE — 99213 OFFICE O/P EST LOW 20 MIN: CPT | Mod: 25 | Performed by: FAMILY MEDICINE

## 2022-10-13 PROCEDURE — G0145 SCR C/V CYTO,THINLAYER,RESCR: HCPCS | Performed by: FAMILY MEDICINE

## 2022-10-13 PROCEDURE — 87624 HPV HI-RISK TYP POOLED RSLT: CPT | Performed by: FAMILY MEDICINE

## 2022-10-13 PROCEDURE — 99396 PREV VISIT EST AGE 40-64: CPT | Performed by: FAMILY MEDICINE

## 2022-10-13 RX ORDER — FENOFIBRATE 48 MG/1
48 TABLET, COATED ORAL DAILY
Qty: 90 TABLET | Refills: 3 | Status: SHIPPED | OUTPATIENT
Start: 2022-10-13 | End: 2023-10-02

## 2022-10-13 RX ORDER — TRIAMCINOLONE ACETONIDE 1 MG/G
CREAM TOPICAL 2 TIMES DAILY
Qty: 45 G | Refills: 3 | Status: SHIPPED | OUTPATIENT
Start: 2022-10-13

## 2022-10-13 RX ORDER — VALACYCLOVIR HYDROCHLORIDE 1 G/1
TABLET, FILM COATED ORAL
Qty: 12 TABLET | Refills: 3 | Status: SHIPPED | OUTPATIENT
Start: 2022-10-13 | End: 2023-10-16

## 2022-10-13 ASSESSMENT — PAIN SCALES - GENERAL: PAINLEVEL: NO PAIN (0)

## 2022-10-17 LAB
BKR LAB AP GYN ADEQUACY: NORMAL
BKR LAB AP GYN INTERPRETATION: NORMAL
BKR LAB AP HPV REFLEX: NORMAL
BKR LAB AP PREVIOUS ABNORMAL: NORMAL
PATH REPORT.COMMENTS IMP SPEC: NORMAL
PATH REPORT.COMMENTS IMP SPEC: NORMAL
PATH REPORT.RELEVANT HX SPEC: NORMAL

## 2022-10-19 LAB
HUMAN PAPILLOMA VIRUS 16 DNA: NEGATIVE
HUMAN PAPILLOMA VIRUS 18 DNA: NEGATIVE
HUMAN PAPILLOMA VIRUS FINAL DIAGNOSIS: NORMAL
HUMAN PAPILLOMA VIRUS OTHER HR: NEGATIVE

## 2023-03-03 ENCOUNTER — ANCILLARY PROCEDURE (OUTPATIENT)
Dept: MAMMOGRAPHY | Facility: CLINIC | Age: 58
End: 2023-03-03
Attending: FAMILY MEDICINE
Payer: COMMERCIAL

## 2023-03-03 DIAGNOSIS — Z12.31 VISIT FOR SCREENING MAMMOGRAM: ICD-10-CM

## 2023-03-03 PROCEDURE — 77067 SCR MAMMO BI INCL CAD: CPT | Mod: TC | Performed by: RADIOLOGY

## 2023-03-09 ENCOUNTER — ALLIED HEALTH/NURSE VISIT (OUTPATIENT)
Dept: FAMILY MEDICINE | Facility: CLINIC | Age: 58
End: 2023-03-09
Payer: COMMERCIAL

## 2023-03-09 DIAGNOSIS — Z23 NEED FOR VACCINATION: Primary | ICD-10-CM

## 2023-03-09 PROCEDURE — 90746 HEPB VACCINE 3 DOSE ADULT IM: CPT

## 2023-03-09 PROCEDURE — 99207 PR NO CHARGE NURSE ONLY: CPT

## 2023-03-09 PROCEDURE — 90471 IMMUNIZATION ADMIN: CPT

## 2023-03-09 NOTE — NURSING NOTE
Prior to immunization administration, verified patients identity using patient s name and date of birth. Please see Immunization Activity for additional information.     Screening Questionnaire for Adult Immunization    Are you sick today?   No   Do you have allergies to medications, food, a vaccine component or latex?   No   Have you ever had a serious reaction after receiving a vaccination?   No   Do you have a long-term health problem with heart, lung, kidney, or metabolic disease (e.g., diabetes), asthma, a blood disorder, no spleen, complement component deficiency, a cochlear implant, or a spinal fluid leak?  Are you on long-term aspirin therapy?   No   Do you have cancer, leukemia, HIV/AIDS, or any other immune system problem?   No   Do you have a parent, brother, or sister with an immune system problem?   No   In the past 3 months, have you taken medications that affect  your immune system, such as prednisone, other steroids, or anticancer drugs; drugs for the treatment of rheumatoid arthritis, Crohn s disease, or psoriasis; or have you had radiation treatments?   No   Have you had a seizure, or a brain or other nervous system problem?   No   During the past year, have you received a transfusion of blood or blood    products, or been given immune (gamma) globulin or antiviral drug?   No   For women: Are you pregnant or is there a chance you could become       pregnant during the next month?   No   Have you received any vaccinations in the past 4 weeks?   No     Immunization questionnaire answers were all negative.        Per orders of Dr. Smith, injection of Hep B given by Coty Reyes. Patient instructed to remain in clinic for 15 minutes afterwards, and to report any adverse reaction to me immediately.       Screening performed by Coty Reyes on 3/9/2023 at 9:22 AM.

## 2023-04-06 ENCOUNTER — ALLIED HEALTH/NURSE VISIT (OUTPATIENT)
Dept: FAMILY MEDICINE | Facility: CLINIC | Age: 58
End: 2023-04-06
Payer: COMMERCIAL

## 2023-04-06 DIAGNOSIS — Z23 NEED FOR VACCINATION: Primary | ICD-10-CM

## 2023-04-06 PROCEDURE — 90746 HEPB VACCINE 3 DOSE ADULT IM: CPT

## 2023-04-06 PROCEDURE — 90471 IMMUNIZATION ADMIN: CPT

## 2023-04-06 PROCEDURE — 99207 PR NO CHARGE NURSE ONLY: CPT

## 2023-04-06 NOTE — NURSING NOTE
Prior to immunization administration, verified patients identity using patient s name and date of birth. Please see Immunization Activity for additional information.     Screening Questionnaire for Adult Immunization    Are you sick today?   No   Do you have allergies to medications, food, a vaccine component or latex?   No   Have you ever had a serious reaction after receiving a vaccination?   No   Do you have a long-term health problem with heart, lung, kidney, or metabolic disease (e.g., diabetes), asthma, a blood disorder, no spleen, complement component deficiency, a cochlear implant, or a spinal fluid leak?  Are you on long-term aspirin therapy?   No   Do you have cancer, leukemia, HIV/AIDS, or any other immune system problem?   No   Do you have a parent, brother, or sister with an immune system problem?   No   In the past 3 months, have you taken medications that affect  your immune system, such as prednisone, other steroids, or anticancer drugs; drugs for the treatment of rheumatoid arthritis, Crohn s disease, or psoriasis; or have you had radiation treatments?   No   Have you had a seizure, or a brain or other nervous system problem?   No   During the past year, have you received a transfusion of blood or blood    products, or been given immune (gamma) globulin or antiviral drug?   No   For women: Are you pregnant or is there a chance you could become       pregnant during the next month?   No   Have you received any vaccinations in the past 4 weeks?   No     Immunization questionnaire answers were all negative.    I have reviewed the following standing orders:   This patient is due and qualifies for the Hepatitis B vaccine.    Click here for Hepatitis B Standing Order    I have reviewed the vaccines inclusion and exclusion criteria; No concerns regarding eligibility.         Injection of hep b given by Sharla Tellez MA. Patient instructed to remain in clinic for 15 minutes afterwards, and to report any  adverse reactions.     Screening performed by Sharla Tellez MA on 4/6/2023 at 9:19 AM.

## 2023-06-05 NOTE — MR AVS SNAPSHOT
After Visit Summary   7/2/2018    Tika Mendoza    MRN: [id number]           Patient Information     Date Of Birth          1965        Visit Information        Provider Department      7/2/2018 2:00 PM Cathie Smith MD [facility]        Today's Diagnoses     10 year risk of MI or stroke 7.5% or greater    -  1    Hypertriglyceridemia           Follow-ups after your visit        Additional Services     NUTRITION REFERRAL       Your provider has referred you to: [facility]: [facility] Nelida Hidalgo [phone]   [email or web address]    Please be aware that coverage of these services is subject to the terms and limitations of your health insurance plan.  Call member services at your health plan with any benefit or coverage questions.      Please bring the following with you to your appointment:    (1) This referral request  (2) Any documents given to you regarding this referral  (3) Any specific questions you have about diet and/or food choices                  Follow-up notes from your care team     Return in about 4 months (around 11/2/2018) for Fasting Lab Work.      Future tests that were ordered for you today     Open Future Orders        Priority Expected Expires Ordered    Lipid panel reflex to direct LDL Fasting Routine 7/2/2018 7/2/2019 7/2/2018            Who to contact     If you have questions or need follow up information about today's clinic visit or your schedule please contact [facility] directly at [phone].  Normal or non-critical lab and imaging results will be communicated to you by MyChart, letter or phone within 4 business days after the clinic has received the results. If you do not hear from us within 7 days, please contact the clinic through MyChart or phone. If you have a critical or abnormal lab result, we will notify you by phone as soon as possible.  Submit refill requests through [facility] or call your pharmacy  and they will forward the refill request to us. Please allow 3 business days for your refill to be completed.          Additional Information About Your Visit        Care EveryWhere ID     This is your Care EveryWhere ID. This could be used by other organizations to access your [place] medical records  [id number]        Your Vitals Were     Pulse Temperature Respirations Last Period Pulse Oximetry BMI (Body Mass Index)    82 98.6  F (37  C) (Oral) 18 01/16/2016 98% 27.87 kg/m2       Blood Pressure from Last 3 Encounters:   07/02/18 128/86   06/27/18 118/79   11/20/17 119/81    Weight from Last 3 Encounters:   07/02/18 152 lb 6.4 oz (69.1 kg)   06/27/18 154 lb (69.9 kg)   11/20/17 155 lb 6.4 oz (70.5 kg)              We Performed the Following     NUTRITION REFERRAL          Today's Medication Changes          These changes are accurate as of 7/2/18  2:20 PM.  If you have any questions, ask your nurse or doctor.               These medicines have changed or have updated prescriptions.        Dose/Directions    fenofibrate 48 MG tablet   This may have changed:  See the new instructions.   Used for:  Hypertriglyceridemia   Changed by:  Cathie Smith MD        Dose:  48 mg   Take 1 tablet (48 mg) by mouth daily   Quantity:  30 tablet   Refills:  5            Where to get your medicines      These medications were sent to [id number] - [facility] [address] [address]  [address] [address] [address]    Hours:  24-hours Phone:  [phone]     fenofibrate 48 MG tablet                Primary Care Provider Office Phone # Fax #    Cathie Smith [phone] [address] [facility] [address] [facility] [address]        Equal Access to Services     ZANDER NEGRETE : Hadii kodi Avila, waaxda luqadaha, qaybta kaalmada jess, hannah posey. [facility] [phone].    ATENCIÓN: Rosanna blackburn,  tiene a alfaro disposición servicios gratuitos de asistencia lingüística. Cyrus sena [phone].    We comply with applicable federal civil rights laws and [place] laws. We do not discriminate on the basis of race, color, national origin, age, disability, sex, sexual orientation, or gender identity.            Thank you!     Thank you for choosing [facility]  for your care. Our goal is always to provide you with excellent care. Hearing back from our patients is one way we can continue to improve our services. Please take a few minutes to complete the written survey that you may receive in the mail after your visit with us. Thank you!             Your Updated Medication List - Protect others around you: Learn how to safely use, store and throw away your medicines at www.disposemymeds.org.          This list is accurate as of 7/2/18  2:20 PM.  Always use your most recent med list.                   Brand Name Dispense Instructions for use Diagnosis    ALLEGRA PO           ciprofloxacin 500 MG tablet    CIPRO    20 tablet    Take 1 tablet (500 mg) by mouth 2 times daily    Acute cystitis with hematuria       fenofibrate 48 MG tablet     30 tablet    Take 1 tablet (48 mg) by mouth daily    Hypertriglyceridemia       valACYclovir 1000 mg tablet    VALTREX    8 tablet    Take 2 tablets (2,000 mg) by mouth 2 times daily 2 grams bid for 1 day    Herpes simplex labialis          [place]

## 2023-09-14 ENCOUNTER — ALLIED HEALTH/NURSE VISIT (OUTPATIENT)
Dept: FAMILY MEDICINE | Facility: CLINIC | Age: 58
End: 2023-09-14
Payer: COMMERCIAL

## 2023-09-14 DIAGNOSIS — Z23 NEED FOR HEPATITIS B VACCINATION: Primary | ICD-10-CM

## 2023-09-14 PROCEDURE — 99207 PR NO CHARGE NURSE ONLY: CPT

## 2023-09-14 PROCEDURE — 90746 HEPB VACCINE 3 DOSE ADULT IM: CPT

## 2023-09-14 PROCEDURE — 90471 IMMUNIZATION ADMIN: CPT

## 2023-09-14 NOTE — PROGRESS NOTES
Prior to immunization administration, verified patients identity using patient s name and date of birth. Please see Immunization Activity for additional information.     Screening Questionnaire for Adult Immunization    Are you sick today?   No   Do you have allergies to medications, food, a vaccine component or latex?   No   Have you ever had a serious reaction after receiving a vaccination?   No   Do you have a long-term health problem with heart, lung, kidney, or metabolic disease (e.g., diabetes), asthma, a blood disorder, no spleen, complement component deficiency, a cochlear implant, or a spinal fluid leak?  Are you on long-term aspirin therapy?   No   Do you have cancer, leukemia, HIV/AIDS, or any other immune system problem?   No   Do you have a parent, brother, or sister with an immune system problem?   No   In the past 3 months, have you taken medications that affect  your immune system, such as prednisone, other steroids, or anticancer drugs; drugs for the treatment of rheumatoid arthritis, Crohn s disease, or psoriasis; or have you had radiation treatments?   No   Have you had a seizure, or a brain or other nervous system problem?   No   During the past year, have you received a transfusion of blood or blood    products, or been given immune (gamma) globulin or antiviral drug?   No   For women: Are you pregnant or is there a chance you could become       pregnant during the next month?   No   Have you received any vaccinations in the past 4 weeks?   No     Immunization questionnaire answers were all negative.    I have reviewed the following standing orders:   This patient is due and qualifies for the Hepatitis B vaccine.    Click here for Hepatitis B Standing Order    I have reviewed the vaccines inclusion and exclusion criteria; No concerns regarding eligibility.     Patient instructed to remain in clinic for 15 minutes afterwards, and to report any adverse reactions.     Screening performed by Oseas BRITO  JESUS Elder on 9/14/2023 at 9:43 AM.

## 2023-09-27 ENCOUNTER — DOCUMENTATION ONLY (OUTPATIENT)
Dept: FAMILY MEDICINE | Facility: CLINIC | Age: 58
End: 2023-09-27
Payer: COMMERCIAL

## 2023-09-27 DIAGNOSIS — E78.1 HYPERTRIGLYCERIDEMIA: Primary | ICD-10-CM

## 2023-09-27 NOTE — PROGRESS NOTES
Tika A Kelly has an upcoming lab appointment:    Future Appointments   Date Time Provider Department Center   10/9/2023  9:15 AM [facility]   10/16/2023  8:30 AM Cathie Smith MD [facility]         There is no order available. Please review and place either future orders or HMPO (Review of Health Maintenance Protocol Orders), as appropriate.    Health Maintenance Due   Topic    ANNUAL REVIEW OF HM ORDERS     LIPID      Aziza BLOODT

## 2023-10-02 DIAGNOSIS — E78.1 HYPERTRIGLYCERIDEMIA: ICD-10-CM

## 2023-10-02 RX ORDER — FENOFIBRATE 48 MG/1
TABLET, COATED ORAL
Qty: 90 TABLET | Refills: 3 | Status: SHIPPED | OUTPATIENT
Start: 2023-10-02

## 2023-10-09 ENCOUNTER — LAB (OUTPATIENT)
Dept: LAB | Facility: CLINIC | Age: 58
End: 2023-10-09
Payer: COMMERCIAL

## 2023-10-09 DIAGNOSIS — E78.1 HYPERTRIGLYCERIDEMIA: ICD-10-CM

## 2023-10-09 LAB
ALBUMIN SERPL BCG-MCNC: 4.4 G/DL (ref 3.5–5.2)
ALP SERPL-CCNC: 102 U/L (ref 35–104)
ALT SERPL W P-5'-P-CCNC: 41 U/L (ref 0–50)
ANION GAP SERPL CALCULATED.3IONS-SCNC: 11 MMOL/L (ref 7–15)
AST SERPL W P-5'-P-CCNC: 42 U/L (ref 0–45)
BILIRUB SERPL-MCNC: 0.2 MG/DL
BUN SERPL-MCNC: 20.6 MG/DL (ref 6–20)
CALCIUM SERPL-MCNC: 10 MG/DL (ref 8.6–10)
CHLORIDE SERPL-SCNC: 105 MMOL/L (ref 98–107)
CHOLEST SERPL-MCNC: 246 MG/DL
CREAT SERPL-MCNC: 0.92 MG/DL (ref 0.51–0.95)
DEPRECATED HCO3 PLAS-SCNC: 27 MMOL/L (ref 22–29)
EGFRCR SERPLBLD CKD-EPI 2021: 72 ML/MIN/1.73M2
GLUCOSE SERPL-MCNC: 109 MG/DL (ref 70–99)
HDLC SERPL-MCNC: 58 MG/DL
LDLC SERPL CALC-MCNC: 158 MG/DL
NONHDLC SERPL-MCNC: 188 MG/DL
POTASSIUM SERPL-SCNC: 4 MMOL/L (ref 3.4–5.3)
PROT SERPL-MCNC: 7.4 G/DL (ref 6.4–8.3)
SODIUM SERPL-SCNC: 143 MMOL/L (ref 135–145)
TRIGL SERPL-MCNC: 150 MG/DL

## 2023-10-09 PROCEDURE — 80053 COMPREHEN METABOLIC PANEL: CPT

## 2023-10-09 PROCEDURE — 80061 LIPID PANEL: CPT

## 2023-10-09 PROCEDURE — 36415 COLL VENOUS BLD VENIPUNCTURE: CPT

## 2023-10-14 ASSESSMENT — ENCOUNTER SYMPTOMS
JOINT SWELLING: 0
DIZZINESS: 0
PALPITATIONS: 0
BREAST MASS: 0
ABDOMINAL PAIN: 0
ARTHRALGIAS: 1
HEADACHES: 1
FREQUENCY: 0
EYE PAIN: 0
COUGH: 0
NAUSEA: 0
WEAKNESS: 0
HEMATURIA: 0
FEVER: 0
HEMATOCHEZIA: 0
NERVOUS/ANXIOUS: 0
DIARRHEA: 0
HEARTBURN: 0
MYALGIAS: 1
DYSURIA: 0
CHILLS: 0
CONSTIPATION: 0
SORE THROAT: 0
PARESTHESIAS: 0
SHORTNESS OF BREATH: 0

## 2023-10-16 ENCOUNTER — OFFICE VISIT (OUTPATIENT)
Dept: FAMILY MEDICINE | Facility: CLINIC | Age: 58
End: 2023-10-16
Payer: COMMERCIAL

## 2023-10-16 VITALS
DIASTOLIC BLOOD PRESSURE: 86 MMHG | TEMPERATURE: 97 F | RESPIRATION RATE: 16 BRPM | BODY MASS INDEX: 27.42 KG/M2 | OXYGEN SATURATION: 97 % | HEIGHT: 62 IN | SYSTOLIC BLOOD PRESSURE: 132 MMHG | WEIGHT: 149 LBS | HEART RATE: 70 BPM

## 2023-10-16 DIAGNOSIS — E78.1 HYPERTRIGLYCERIDEMIA: ICD-10-CM

## 2023-10-16 DIAGNOSIS — Z00.00 ROUTINE GENERAL MEDICAL EXAMINATION AT A HEALTH CARE FACILITY: Primary | ICD-10-CM

## 2023-10-16 DIAGNOSIS — B00.1 HERPES SIMPLEX LABIALIS: ICD-10-CM

## 2023-10-16 PROCEDURE — 99213 OFFICE O/P EST LOW 20 MIN: CPT | Mod: 25 | Performed by: FAMILY MEDICINE

## 2023-10-16 PROCEDURE — 99396 PREV VISIT EST AGE 40-64: CPT | Performed by: FAMILY MEDICINE

## 2023-10-16 RX ORDER — VALACYCLOVIR HYDROCHLORIDE 1 G/1
TABLET, FILM COATED ORAL
Qty: 12 TABLET | Refills: 3 | Status: SHIPPED | OUTPATIENT
Start: 2023-10-16

## 2023-10-16 ASSESSMENT — ENCOUNTER SYMPTOMS
WEAKNESS: 0
CHILLS: 0
MYALGIAS: 1
JOINT SWELLING: 0
NAUSEA: 0
PALPITATIONS: 0
PARESTHESIAS: 0
FEVER: 0
SHORTNESS OF BREATH: 0
NERVOUS/ANXIOUS: 0
ABDOMINAL PAIN: 0
HEMATURIA: 0
HEARTBURN: 0
BREAST MASS: 0
DIARRHEA: 0
HEADACHES: 1
DIZZINESS: 0
CONSTIPATION: 0
HEMATOCHEZIA: 0
ARTHRALGIAS: 1
SORE THROAT: 0
EYE PAIN: 0
COUGH: 0
DYSURIA: 0
FREQUENCY: 0

## 2023-10-16 ASSESSMENT — PAIN SCALES - GENERAL: PAINLEVEL: NO PAIN (0)

## 2023-10-16 NOTE — PROGRESS NOTES
SUBJECTIVE:   CC: Tika is an 58 year old who presents for preventive health visit.       10/16/2023     8:12 AM   Additional Questions   Roomed by Saritha   Accompanied by n/a       Healthy Habits:     Getting at least 3 servings of Calcium per day:  NO    Bi-annual eye exam:  Yes    Dental care twice a year:  Yes    Sleep apnea or symptoms of sleep apnea:  None    Diet:  Regular (no restrictions)    Frequency of exercise:  4-5 days/week    Duration of exercise:  45-60 minutes    Taking medications regularly:  Yes    Medication side effects:  None    Additional concerns today:  Yes            Social History     Tobacco Use    Smoking status: Some Days     Packs/day: 0.50     Years: 33.00     Additional pack years: 0.00     Total pack years: 16.50     Types: Cigarettes     Start date: 10/14/1987     Last attempt to quit: 10/21/2020     Years since quittin.9    Smokeless tobacco: Never    Tobacco comments:     2 cigarettes daily    Substance Use Topics    Alcohol use: No             10/14/2023    12:12 PM   Alcohol Use   Prescreen: >3 drinks/day or >7 drinks/week? No     Reviewed orders with patient.  Reviewed health maintenance and updated orders accordingly - Yes    G 0 P 0   Patient's last menstrual period was 2016.     Fasting: No   Td:tdap 2020       Flu: 10/2023      Covid: 10/6/2023      Shingrix: done      PPV: PPV 23      Last 3 Pap and HPV Results:       Latest Ref Rng & Units 10/13/2022     9:06 AM 2017     8:34 AM 2017     8:22 AM   PAP / HPV   PAP  Negative for Intraepithelial Lesion or Malignancy (NILM)      PAP (Historical)    NIL    HPV 16 DNA Negative Negative  Negative     HPV 18 DNA Negative Negative  Negative     Other HR HPV Negative Negative  Negative                    Cholesterol:   Lab Results   Component Value Date    CHOL 246 10/09/2023    CHOL 263 2020     Lab Results   Component Value Date    HDL 58 10/09/2023    HDL 46 2020     Lab Results   Component  Value Date     10/09/2023     11/11/2020     Lab Results   Component Value Date    TRIG 150 10/09/2023    TRIG 315 11/11/2020     Lab Results   Component Value Date    CHOLHDLRATIO 5.9 04/30/2013     The 10-year ASCVD risk score (Rea TRAORE, et al., 2019) is: 7.2%    Values used to calculate the score:      Age: 58 years      Sex: Female      Is Non- : No      Diabetic: No      Tobacco smoker: Yes      Systolic Blood Pressure: 132 mmHg      Is BP treated: No      HDL Cholesterol: 58 mg/dL      Total Cholesterol: 246 mg/dL     MMG: 3/2023  Dexa:  NA     Flex/colo: 1/2022 q7y scope      Seat Belt: Yes    Sunscreen use: Yes   Calcium Intake: adeq  Health Care Directive: No  Sexually Active: Yes     Current contraception: none  History of abnormal Pap smear: No  Family history of colon/breast/ovarian cancer: No  Regular self breast exam: Yes  History of abnormal mammogram: Yes: see reports      Labs reviewed in EPIC  BP Readings from Last 3 Encounters:   10/16/23 132/86   10/13/22 118/72   01/24/22 (!) 149/72    Wt Readings from Last 3 Encounters:   10/16/23 67.6 kg (149 lb)   10/13/22 67 kg (147 lb 12.8 oz)   10/27/21 73.8 kg (162 lb 9.6 oz)                  Patient Active Problem List   Diagnosis    CARDIOVASCULAR SCREENING; LDL GOAL LESS THAN 160    Menopausal syndrome (hot flashes)    Tobacco abuse    Herpes simplex labialis    Hypertriglyceridemia    Primary osteoarthritis of both hands    Benign neoplasm of colon     Past Surgical History:   Procedure Laterality Date    CHOLECYSTECTOMY      1989    COLONOSCOPY N/A 1/24/2022    Procedure: COLONOSCOPY, WITH POLYPECTOMY AND BIOPSY;  Surgeon: Morena Olivo MD;  Location: [facility] OR    COLONOSCOPY WITH CO2 INSUFFLATION N/A 11/8/2017    Procedure: COLONOSCOPY WITH CO2 INSUFFLATION;  COLON SCREEN/ MCGOWAN;  Surgeon: Nain Humphreys DO;  Location: [facility] OR    COLONOSCOPY WITH CO2 INSUFFLATION N/A 1/24/2022    Procedure: COLONOSCOPY,  WITH CO2 INSUFFLATION;  Surgeon: Morena Olivo MD;  Location: [facility] OR    DILATION AND CURETTAGE      EXCISE MASS FINGER  5/15/2012    Procedure:EXCISE MASS FINGER; Left Middle Finger Mucinous Cyst Excision, Rotator Flap Distal Interphalangeal Debridement; Surgeon:MIGUELINA LUGO; Location:[facility]       Social History     Tobacco Use    Smoking status: Some Days     Packs/day: 0.50     Years: 33.00     Additional pack years: 0.00     Total pack years: 16.50     Types: Cigarettes     Start date: 10/14/1987     Last attempt to quit: 10/21/2020     Years since quittin.9    Smokeless tobacco: Never    Tobacco comments:     2 cigarettes daily    Substance Use Topics    Alcohol use: No     Family History   Problem Relation Age of Onset    Neurologic Disorder Father          of MS     Other Cancer Other     Substance Abuse Brother     Asthma No family hx of     C.A.D. No family hx of     Diabetes No family hx of     Hypertension No family hx of     Cerebrovascular Disease No family hx of     Breast Cancer No family hx of     Cancer - colorectal No family hx of     Prostate Cancer No family hx of          Current Outpatient Medications   Medication Sig Dispense Refill    fenofibrate (TRICOR) 48 MG tablet TAKE 1 TABLET(48 MG) BY MOUTH DAILY 90 tablet 3    Fexofenadine HCl (ALLEGRA PO)       triamcinolone (KENALOG) 0.1 % external cream Apply topically 2 times daily 45 g 3    valACYclovir (VALTREX) 1000 mg tablet 2 grams twice a day for 1 day 12 tablet 3       Breast Cancer Screening:        10/8/2022     7:52 PM   Breast CA Risk Assessment (FHS-7)   Do you have a family history of breast, colon, or ovarian cancer? No / Unknown         Mammogram Screening: Recommended mammography every 1-2 years with patient discussion and risk factor consideration  Pertinent mammograms are reviewed under the imaging tab.        Reviewed and updated as needed this visit by clinical staff   Tobacco  Allergies  Meds  Problems  " Med Hx  Surg Hx  Fam Hx          Reviewed and updated as needed this visit by Provider   Tobacco  Allergies  Meds  Problems  Med Hx  Surg Hx  Fam Hx             Review of Systems   Constitutional:  Negative for chills and fever.   HENT:  Negative for congestion, ear pain, hearing loss and sore throat.    Eyes:  Negative for pain and visual disturbance.   Respiratory:  Negative for cough and shortness of breath.    Cardiovascular:  Negative for chest pain, palpitations and peripheral edema.   Gastrointestinal:  Negative for abdominal pain, constipation, diarrhea, heartburn, hematochezia and nausea.   Breasts:  Negative for tenderness, breast mass and discharge.   Genitourinary:  Negative for dysuria, frequency, genital sores, hematuria, pelvic pain, urgency, vaginal bleeding and vaginal discharge.   Musculoskeletal:  Positive for arthralgias (right knee ache/awakens her at night about 3 times per week, currently no limitations on activity will occas take ibuprofen) and myalgias. Negative for joint swelling.   Skin:  Negative for rash.   Neurological:  Positive for headaches. Negative for dizziness, weakness and paresthesias.   Psychiatric/Behavioral:  Negative for mood changes. The patient is not nervous/anxious.           OBJECTIVE:   /86   Pulse 70   Temp 97  F (36.1  C) (Tympanic)   Resp 16   Ht 1.575 m (5' 2\")   Wt 67.6 kg (149 lb)   LMP 01/16/2016   SpO2 97%   BMI 27.25 kg/m    Physical Exam  GENERAL APPEARANCE: healthy, alert and no distress  EYES: Eyes grossly normal to inspection, PERRL and conjunctivae and sclerae normal  HENT: ear canals and TM's normal, nose and mouth without ulcers or lesions, oropharynx clear and oral mucous membranes moist  NECK: no adenopathy, no asymmetry, masses, or scars and thyroid normal to palpation  RESP: lungs clear to auscultation - no rales, rhonchi or wheezes  BREAST: normal without masses, tenderness or nipple discharge and no palpable axillary " masses or adenopathy  CV: regular rate and rhythm, normal S1 S2, no S3 or S4, no murmur, click or rub, no peripheral edema and peripheral pulses strong  ABDOMEN: soft, nontender, no hepatosplenomegaly, no masses and bowel sounds normal  MS: no musculoskeletal defects are noted and gait is age appropriate without ataxia  SKIN: no suspicious lesions or rashes  NEURO: Normal strength and tone, sensory exam grossly normal, mentation intact and speech normal  PSYCH: mentation appears normal and affect normal/bright    Diagnostic Test Results:  Labs reviewed in Epic    ASSESSMENT/PLAN:   (Z00.00) Routine general medical examination at a health care facility  (primary encounter diagnosis)  Comment: preventive needs reviewed   Plan: see orders in Epic.     (E78.1) Hypertriglyceridemia  Comment: to goal  Plan: continue fenofibrate, follow-up 1 yr for lipids  Recent Refill x 1 yr     (B00.1) Herpes simplex labialis  Comment: episodic  Plan: valACYclovir (VALTREX) 1000 mg tablet        Refill x 1 yr           COUNSELING:  Reviewed preventive health counseling, as reflected in patient instructions  Special attention given to:        Regular exercise       Healthy diet/nutrition        She reports that she has been smoking cigarettes. She started smoking about 36 years ago. She has a 16.50 pack-year smoking history. She has never used smokeless tobacco.          Cathie Smith MD  [facility]

## 2023-10-16 NOTE — PATIENT INSTRUCTIONS

## 2024-01-30 ENCOUNTER — PATIENT OUTREACH (OUTPATIENT)
Dept: GASTROENTEROLOGY | Facility: CLINIC | Age: 59
End: 2024-01-30
Payer: COMMERCIAL

## 2024-03-21 ENCOUNTER — ANCILLARY PROCEDURE (OUTPATIENT)
Dept: MAMMOGRAPHY | Facility: CLINIC | Age: 59
End: 2024-03-21
Payer: COMMERCIAL

## 2024-03-21 DIAGNOSIS — Z12.31 VISIT FOR SCREENING MAMMOGRAM: ICD-10-CM

## 2024-03-21 PROCEDURE — 77063 BREAST TOMOSYNTHESIS BI: CPT | Mod: TC | Performed by: RADIOLOGY

## 2024-03-21 PROCEDURE — 77067 SCR MAMMO BI INCL CAD: CPT | Mod: TC | Performed by: RADIOLOGY

## 2024-09-30 ENCOUNTER — PATIENT OUTREACH (OUTPATIENT)
Dept: CARE COORDINATION | Facility: CLINIC | Age: 59
End: 2024-09-30
Payer: COMMERCIAL

## 2024-10-07 DIAGNOSIS — E78.1 HYPERTRIGLYCERIDEMIA: ICD-10-CM

## 2024-10-07 RX ORDER — FENOFIBRATE 48 MG/1
TABLET, COATED ORAL
Qty: 90 TABLET | Refills: 3 | Status: SHIPPED | OUTPATIENT
Start: 2024-10-07 | End: 2024-10-30

## 2024-10-23 ENCOUNTER — LAB (OUTPATIENT)
Dept: LAB | Facility: CLINIC | Age: 59
End: 2024-10-23
Payer: COMMERCIAL

## 2024-10-23 DIAGNOSIS — E78.1 HYPERTRIGLYCERIDEMIA: ICD-10-CM

## 2024-10-23 LAB
ALBUMIN SERPL BCG-MCNC: 4.3 G/DL (ref 3.5–5.2)
ALP SERPL-CCNC: 85 U/L (ref 40–150)
ALT SERPL W P-5'-P-CCNC: 24 U/L (ref 0–50)
ANION GAP SERPL CALCULATED.3IONS-SCNC: 11 MMOL/L (ref 7–15)
AST SERPL W P-5'-P-CCNC: 29 U/L (ref 0–45)
BILIRUB SERPL-MCNC: 0.3 MG/DL
BUN SERPL-MCNC: 20.8 MG/DL (ref 8–23)
CALCIUM SERPL-MCNC: 9.5 MG/DL (ref 8.8–10.4)
CHLORIDE SERPL-SCNC: 107 MMOL/L (ref 98–107)
CHOLEST SERPL-MCNC: 243 MG/DL
CREAT SERPL-MCNC: 0.93 MG/DL (ref 0.51–0.95)
EGFRCR SERPLBLD CKD-EPI 2021: 70 ML/MIN/1.73M2
FASTING STATUS PATIENT QL REPORTED: YES
FASTING STATUS PATIENT QL REPORTED: YES
GLUCOSE SERPL-MCNC: 101 MG/DL (ref 70–99)
HCO3 SERPL-SCNC: 25 MMOL/L (ref 22–29)
HDLC SERPL-MCNC: 55 MG/DL
LDLC SERPL CALC-MCNC: 154 MG/DL
NONHDLC SERPL-MCNC: 188 MG/DL
POTASSIUM SERPL-SCNC: 3.7 MMOL/L (ref 3.4–5.3)
PROT SERPL-MCNC: 7.1 G/DL (ref 6.4–8.3)
SODIUM SERPL-SCNC: 143 MMOL/L (ref 135–145)
TRIGL SERPL-MCNC: 168 MG/DL

## 2024-10-23 PROCEDURE — 36415 COLL VENOUS BLD VENIPUNCTURE: CPT

## 2024-10-23 PROCEDURE — 80061 LIPID PANEL: CPT

## 2024-10-23 PROCEDURE — 80053 COMPREHEN METABOLIC PANEL: CPT

## 2024-10-30 ENCOUNTER — OFFICE VISIT (OUTPATIENT)
Dept: FAMILY MEDICINE | Facility: CLINIC | Age: 59
End: 2024-10-30
Attending: FAMILY MEDICINE
Payer: COMMERCIAL

## 2024-10-30 VITALS
WEIGHT: 147.8 LBS | HEART RATE: 78 BPM | HEIGHT: 63 IN | BODY MASS INDEX: 26.19 KG/M2 | OXYGEN SATURATION: 96 % | SYSTOLIC BLOOD PRESSURE: 132 MMHG | RESPIRATION RATE: 18 BRPM | TEMPERATURE: 98.2 F | DIASTOLIC BLOOD PRESSURE: 82 MMHG

## 2024-10-30 DIAGNOSIS — E78.5 HYPERLIPIDEMIA LDL GOAL <100: ICD-10-CM

## 2024-10-30 DIAGNOSIS — Z12.31 ENCOUNTER FOR SCREENING MAMMOGRAM FOR BREAST CANCER: ICD-10-CM

## 2024-10-30 DIAGNOSIS — Z00.00 ROUTINE GENERAL MEDICAL EXAMINATION AT A HEALTH CARE FACILITY: Primary | ICD-10-CM

## 2024-10-30 DIAGNOSIS — E78.1 HYPERTRIGLYCERIDEMIA: ICD-10-CM

## 2024-10-30 DIAGNOSIS — L30.9 ECZEMA, UNSPECIFIED TYPE: ICD-10-CM

## 2024-10-30 DIAGNOSIS — B00.1 HERPES SIMPLEX LABIALIS: ICD-10-CM

## 2024-10-30 PROCEDURE — 99396 PREV VISIT EST AGE 40-64: CPT | Performed by: FAMILY MEDICINE

## 2024-10-30 PROCEDURE — 99214 OFFICE O/P EST MOD 30 MIN: CPT | Mod: 25 | Performed by: FAMILY MEDICINE

## 2024-10-30 RX ORDER — ATORVASTATIN CALCIUM 10 MG/1
10 TABLET, FILM COATED ORAL DAILY
Qty: 90 TABLET | Refills: 3 | Status: SHIPPED | OUTPATIENT
Start: 2024-10-30

## 2024-10-30 RX ORDER — TRIAMCINOLONE ACETONIDE 1 MG/G
CREAM TOPICAL 2 TIMES DAILY
Qty: 45 G | Refills: 3 | Status: SHIPPED | OUTPATIENT
Start: 2024-10-30

## 2024-10-30 RX ORDER — VALACYCLOVIR HYDROCHLORIDE 1 G/1
TABLET, FILM COATED ORAL
Qty: 12 TABLET | Refills: 3 | Status: SHIPPED | OUTPATIENT
Start: 2024-10-30

## 2024-10-30 RX ORDER — FENOFIBRATE 48 MG/1
48 TABLET, COATED ORAL DAILY
Qty: 90 TABLET | Refills: 3 | Status: SHIPPED | OUTPATIENT
Start: 2024-10-30

## 2024-10-30 SDOH — HEALTH STABILITY: PHYSICAL HEALTH: ON AVERAGE, HOW MANY DAYS PER WEEK DO YOU ENGAGE IN MODERATE TO STRENUOUS EXERCISE (LIKE A BRISK WALK)?: 5 DAYS

## 2024-10-30 SDOH — HEALTH STABILITY: PHYSICAL HEALTH: ON AVERAGE, HOW MANY MINUTES DO YOU ENGAGE IN EXERCISE AT THIS LEVEL?: 70 MIN

## 2024-10-30 ASSESSMENT — PAIN SCALES - GENERAL: PAINLEVEL_OUTOF10: NO PAIN (0)

## 2024-10-30 ASSESSMENT — SOCIAL DETERMINANTS OF HEALTH (SDOH): HOW OFTEN DO YOU GET TOGETHER WITH FRIENDS OR RELATIVES?: ONCE A WEEK

## 2024-10-30 NOTE — PATIENT INSTRUCTIONS
Patient Education   Preventive Care Advice   This is general advice given by our system to help you stay healthy. However, your care team may have specific advice just for you. Please talk to your care team about your preventive care needs.  Nutrition  Eat 5 or more servings of fruits and vegetables each day.  Try wheat bread, brown rice and whole grain pasta (instead of white bread, rice, and pasta).  Get enough calcium and vitamin D. Check the label on foods and aim for 100% of the RDA (recommended daily allowance).  Lifestyle  Exercise at least 150 minutes each week  (30 minutes a day, 5 days a week).  Do muscle strengthening activities 2 days a week. These help control your weight and prevent disease.  No smoking.  Wear sunscreen to prevent skin cancer.  Have a dental exam and cleaning every 6 months.  Yearly exams  See your health care team every year to talk about:  Any changes in your health.  Any medicines your care team has prescribed.  Preventive care, family planning, and ways to prevent chronic diseases.  Shots (vaccines)   HPV shots (up to age 26), if you've never had them before.  Hepatitis B shots (up to age 59), if you've never had them before.  COVID-19 shot: Get this shot when it's due.  Flu shot: Get a flu shot every year.  Tetanus shot: Get a tetanus shot every 10 years.  Pneumococcal, hepatitis A, and RSV shots: Ask your care team if you need these based on your risk.  Shingles shot (for age 50 and up)  General health tests  Diabetes screening:  Starting at age 35, Get screened for diabetes at least every 3 years.  If you are younger than age 35, ask your care team if you should be screened for diabetes.  Cholesterol test: At age 39, start having a cholesterol test every 5 years, or more often if advised.  Bone density scan (DEXA): At age 50, ask your care team if you should have this scan for osteoporosis (brittle bones).  Hepatitis C: Get tested at least once in your life.  STIs (sexually  transmitted infections)  Before age 24: Ask your care team if you should be screened for STIs.  After age 24: Get screened for STIs if you're at risk. You are at risk for STIs (including HIV) if:  You are sexually active with more than one person.  You don't use condoms every time.  You or a partner was diagnosed with a sexually transmitted infection.  If you are at risk for HIV, ask about PrEP medicine to prevent HIV.  Get tested for HIV at least once in your life, whether you are at risk for HIV or not.  Cancer screening tests  Cervical cancer screening: If you have a cervix, begin getting regular cervical cancer screening tests starting at age 21.  Breast cancer scan (mammogram): If you've ever had breasts, begin having regular mammograms starting at age 40. This is a scan to check for breast cancer.  Colon cancer screening: It is important to start screening for colon cancer at age 45.  Have a colonoscopy test every 10 years (or more often if you're at risk) Or, ask your provider about stool tests like a FIT test every year or Cologuard test every 3 years.  To learn more about your testing options, visit:   .  For help making a decision, visit:   https://bit.ly/jm65805.  Prostate cancer screening test: If you have a prostate, ask your care team if a prostate cancer screening test (PSA) at age 55 is right for you.  Lung cancer screening: If you are a current or former smoker ages 50 to 80, ask your care team if ongoing lung cancer screenings are right for you.  For informational purposes only. Not to replace the advice of your health care provider. Copyright   2023 [employer]. All rights reserved. Clinically reviewed by the [facility] Transitions Program. [employer] [id number] - REV 01/24.

## 2024-10-30 NOTE — PROGRESS NOTES
"Preventive Care Visit  [facility]  Cathie Smith MD, Family Medicine  Oct 30, 2024      Assessment & Plan     (Z00.00) Routine general medical examination at a health care facility  (primary encounter diagnosis)  Comment: preventive needs reviewed  Plan: see Epic orders    (E78.1) Hypertriglyceridemia  (E78.5) Hyperlipidemia LDL goal <100  Comment: triglycerides trending up, LDL trending up  Plan: fenofibrate (TRICOR) 48 MG tablet, atorvastatin (LIPITOR) 10 MG tablet, Lipid panel reflex to direct LDL Fasting, Hepatic panel (Albumin, ALT, AST, Bili, Alk Phos, TP)        Add atorvastatin, recheck lipid panel and LFTs in 6 weeks; consider removing fenofibrate if statin is tolerated and labs are improving.    (L30.9) Eczema, unspecified type  Comment: well controlled  Plan: triamcinolone (KENALOG) 0.1 % external cream        Refill x 1 year    (B00.1) Herpes simplex labialis  Comment: well controlled  Plan: valACYclovir (VALTREX) 1000 mg tablet        Refill x 1 year    (Z12.31) Encounter for screening mammogram for breast cancer  Comment: due  Plan: MA Screen Bilateral w/Enrike        Nicotine/Tobacco Cessation  She reports that she has been smoking cigarettes. She started smoking about 37 years ago. She has a 16.5 pack-year smoking history. She has never used smokeless tobacco.  Nicotine/Tobacco Cessation Plan  Information offered: Patient not interested at this time      BMI  Estimated body mass index is 26.6 kg/m  as calculated from the following:    Height as of this encounter: 1.588 m (5' 2.5\").    Weight as of this encounter: 67 kg (147 lb 12.8 oz).   Weight management plan: Discussed healthy diet and exercise guidelines    Counseling  Appropriate preventive services were addressed with this patient via screening, questionnaire, or discussion as appropriate for fall prevention, nutrition, physical activity, Tobacco-use cessation, social engagement, weight loss and cognition.  Checklist " reviewing preventive services available has been given to the patient.  Reviewed patient's diet, addressing concerns and/or questions.   She is at risk for psychosocial distress and has been provided with information to reduce risk.         Adore Villela is a 59 year old, presenting for the following:  Physical        10/30/2024     8:38 AM   Additional Questions   Roomed by Omega TORRES  Tika presents feeling well. Offers no concerns at today's visit.    Health Care Directive  Patient does not have a Health Care Directive: Discussed advance care planning with patient; information given to patient to review.      10/30/2024   General Health   How would you rate your overall physical health? Good   Feel stress (tense, anxious, or unable to sleep) Only a little      (!) STRESS CONCERN      10/30/2024   Nutrition   Three or more servings of calcium each day? Yes   Diet: I don't know   How many servings of fruit and vegetables per day? (!) 0-1   How many sweetened beverages each day? 0-1            10/30/2024   Exercise   Days per week of moderate/strenous exercise 5 days   Average minutes spent exercising at this level 70 min            10/30/2024   Social Factors   Frequency of gathering with friends or relatives Once a week   Worry food won't last until get money to buy more No   Food not last or not have enough money for food? No   Do you have housing? (Housing is defined as stable permanent housing and does not include staying ouside in a car, in a tent, in an abandoned building, in an overnight shelter, or couch-surfing.) Yes   Are you worried about losing your housing? No   Lack of transportation? No   Unable to get utilities (heat,electricity)? No            10/30/2024   Fall Risk   Fallen 2 or more times in the past year? No    Trouble with walking or balance? No        Patient-reported          10/30/2024   Dental   Dentist two times every year? Yes            10/30/2024   TB Screening   Were you  born outside of the [place]? No            Today's PHQ-2 Score:       10/30/2024     7:59 AM   PHQ-2 (  [employer])   Q1: Little interest or pleasure in doing things 0    Q2: Feeling down, depressed or hopeless 0    PHQ-2 Score 0    Q1: Little interest or pleasure in doing things Not at all   Q2: Feeling down, depressed or hopeless Not at all   PHQ-2 Score 0       Patient-reported           10/30/2024   Substance Use   Alcohol more than 3/day or more than 7/wk Not Applicable   Do you use any other substances recreationally? No        Social History     Tobacco Use    Smoking status: Some Days     Current packs/day: 0.00     Average packs/day: 0.5 packs/day for 33.0 years (16.5 ttl pk-yrs)     Types: Cigarettes     Start date: 10/14/1987     Last attempt to quit: 10/21/2020     Years since quittin.0    Smokeless tobacco: Never    Tobacco comments:     2 cigarettes daily    Vaping Use    Vaping status: Never Used   Substance Use Topics    Alcohol use: No    Drug use: No           3/21/2024   LAST FHS-7 RESULTS   1st degree relative breast or ovarian cancer No   Any relative bilateral breast cancer No   Any male have breast cancer No   Any ONE woman have BOTH breast AND ovarian cancer No   Any woman with breast cancer before 50yrs No   2 or more relatives with breast AND/OR ovarian cancer No   2 or more relatives with breast AND/OR bowel cancer No           Mammogram Screening - Mammogram every 1-2 years updated in Health Maintenance based on mutual decision making    G 0 P 0   Patient's last menstrual period was 2016.     Fasting: No   Td: tdap 2020       Flu: 10/2024      Covid: 10/2024      Shingrix: done      PPV: NA       RSV: NA             Cholesterol:   Lab Results   Component Value Date    CHOL 243 10/23/2024    CHOL 263 2020     Lab Results   Component Value Date    HDL 55 10/23/2024    HDL 46 2020     Lab Results   Component Value Date     10/23/2024     2020     Lab  Results   Component Value Date    TRIG 168 10/23/2024    TRIG 315 11/11/2020     Lab Results   Component Value Date    CHOLHDLRATIO 5.9 04/30/2013         MMG: 3/2024  Dexa:  NA     Flex/colo: 1/2022 q7y scope      Seat Belt: Yes    Sunscreen use: Yes   Calcium Intake: adeq   Health Care Directive: No  Sexually Active: Yes     Current contraception: none  History of abnormal Pap smear: No  Family history of colon/breast/ovarian cancer: No  Regular self breast exam: Yes  History of abnormal mammogram: Yes: see reports          10/30/2024   STI Screening   New sexual partner(s) since last STI/HIV test? No        History of abnormal Pap smear: No - age 30- 64 PAP with HPV every 5 years recommended        Latest Ref Rng & Units 10/13/2022     9:06 AM 9/18/2017     8:34 AM 9/18/2017     8:22 AM   PAP / HPV   PAP  Negative for Intraepithelial Lesion or Malignancy (NILM)      PAP (Historical)    NIL    HPV 16 DNA Negative Negative  Negative     HPV 18 DNA Negative Negative  Negative     Other HR HPV Negative Negative  Negative       ASCVD Risk   The 10-year ASCVD risk score (Rea TRAORE, et al., 2019) is: 7.9%    Values used to calculate the score:      Age: 59 years      Sex: Female      Is Non- : No      Diabetic: No      Tobacco smoker: Yes      Systolic Blood Pressure: 132 mmHg      Is BP treated: No      HDL Cholesterol: 55 mg/dL      Total Cholesterol: 243 mg/dL       Reviewed and updated as needed this visit by Provider   Tobacco  Allergies  Meds  Problems  Med Hx  Surg Hx  Fam Hx            Labs reviewed in EPIC  BP Readings from Last 3 Encounters:   10/30/24 132/82   10/16/23 132/86   10/13/22 118/72    Wt Readings from Last 3 Encounters:   10/30/24 67 kg (147 lb 12.8 oz)   10/16/23 67.6 kg (149 lb)   10/13/22 67 kg (147 lb 12.8 oz)                  Patient Active Problem List   Diagnosis    CARDIOVASCULAR SCREENING; LDL GOAL LESS THAN 160    Menopausal syndrome (hot flashes)     Tobacco abuse    Herpes simplex labialis    Hypertriglyceridemia    Primary osteoarthritis of both hands    Benign neoplasm of colon     Past Surgical History:   Procedure Laterality Date    CHOLECYSTECTOMY          COLONOSCOPY N/A 2022    Procedure: COLONOSCOPY, WITH POLYPECTOMY AND BIOPSY;  Surgeon: Morena Olivo MD;  Location: [facility] OR    COLONOSCOPY WITH CO2 INSUFFLATION N/A 2017    Procedure: COLONOSCOPY WITH CO2 INSUFFLATION;  COLON SCREEN/ MCGOWAN;  Surgeon: Nain Humphreys DO;  Location: [facility] OR    COLONOSCOPY WITH CO2 INSUFFLATION N/A 2022    Procedure: COLONOSCOPY, WITH CO2 INSUFFLATION;  Surgeon: Morena Olivo MD;  Location: [facility] OR    DILATION AND CURETTAGE      EXCISE MASS FINGER  5/15/2012    Procedure:EXCISE MASS FINGER; Left Middle Finger Mucinous Cyst Excision, Rotator Flap Distal Interphalangeal Debridement; Surgeon:MIGUELINA LUGO; Location:[facility] OR       Social History     Tobacco Use    Smoking status: Some Days     Current packs/day: 0.00     Average packs/day: 0.5 packs/day for 33.0 years (16.5 ttl pk-yrs)     Types: Cigarettes     Start date: 10/14/1987     Last attempt to quit: 10/21/2020     Years since quittin.0    Smokeless tobacco: Never    Tobacco comments:     2 cigarettes daily    Substance Use Topics    Alcohol use: No     Family History   Problem Relation Age of Onset    Neurologic Disorder Father          of MS     Other Cancer Other     Substance Abuse Brother     Asthma No family hx of     C.A.D. No family hx of     Diabetes No family hx of     Hypertension No family hx of     Cerebrovascular Disease No family hx of     Breast Cancer No family hx of     Cancer - colorectal No family hx of     Prostate Cancer No family hx of          Current Outpatient Medications   Medication Sig Dispense Refill    atorvastatin (LIPITOR) 10 MG tablet Take 1 tablet (10 mg) by mouth daily. 90 tablet 3    fenofibrate (TRICOR) 48 MG tablet Take 1 tablet (48  "mg) by mouth daily. 90 tablet 3    Fexofenadine HCl (ALLEGRA PO)       triamcinolone (KENALOG) 0.1 % external cream Apply topically 2 times daily. 45 g 3    valACYclovir (VALTREX) 1000 mg tablet 2 grams twice a day for 1 day 12 tablet 3         Review of Systems  Constitutional, HEENT, cardiovascular, pulmonary, gi and gu systems are negative, except as otherwise noted.     Objective    Exam  /82   Pulse 78   Temp 98.2  F (36.8  C) (Oral)   Resp 18   Ht 1.588 m (5' 2.5\")   Wt 67 kg (147 lb 12.8 oz)   LMP 01/16/2016   SpO2 96%   BMI 26.60 kg/m     Estimated body mass index is 26.6 kg/m  as calculated from the following:    Height as of this encounter: 1.588 m (5' 2.5\").    Weight as of this encounter: 67 kg (147 lb 12.8 oz).    Physical Exam  GENERAL: alert and no distress  EYES: Eyes grossly normal to inspection, PERRL and conjunctivae and sclerae normal  HENT: ear canals and TM's normal, nose and mouth without ulcers or lesions  NECK: no adenopathy, no asymmetry, masses, or scars  RESP: lungs clear to auscultation - no rales, rhonchi or wheezes  CV: regular rate and rhythm, normal S1 S2, no S3 or S4, no murmur, click or rub, no peripheral edema  ABDOMEN: soft, nontender, no hepatosplenomegaly, no masses and bowel sounds normal  MS: no gross musculoskeletal defects noted, no edema  SKIN: no suspicious lesions or rashes  NEURO: Normal strength and tone, mentation intact and speech normal  PSYCH: mentation appears normal, affect normal/bright        Signed Electronically by: Cathie Smith MD    "

## 2024-12-31 ENCOUNTER — PATIENT OUTREACH (OUTPATIENT)
Dept: CARE COORDINATION | Facility: CLINIC | Age: 59
End: 2024-12-31
Payer: COMMERCIAL

## 2025-01-31 ENCOUNTER — MYC MEDICAL ADVICE (OUTPATIENT)
Dept: FAMILY MEDICINE | Facility: CLINIC | Age: 60
End: 2025-01-31

## 2025-02-04 ENCOUNTER — OFFICE VISIT (OUTPATIENT)
Dept: FAMILY MEDICINE | Facility: CLINIC | Age: 60
End: 2025-02-04
Payer: COMMERCIAL

## 2025-02-04 VITALS
WEIGHT: 155 LBS | OXYGEN SATURATION: 97 % | DIASTOLIC BLOOD PRESSURE: 82 MMHG | TEMPERATURE: 97 F | HEART RATE: 88 BPM | BODY MASS INDEX: 24.33 KG/M2 | SYSTOLIC BLOOD PRESSURE: 138 MMHG | RESPIRATION RATE: 16 BRPM | HEIGHT: 67 IN

## 2025-02-04 DIAGNOSIS — J40 BRONCHITIS: ICD-10-CM

## 2025-02-04 DIAGNOSIS — J32.9 SINUSITIS, UNSPECIFIED CHRONICITY, UNSPECIFIED LOCATION: Primary | ICD-10-CM

## 2025-02-04 PROCEDURE — 99213 OFFICE O/P EST LOW 20 MIN: CPT | Performed by: PHYSICIAN ASSISTANT

## 2025-02-04 RX ORDER — ALBUTEROL SULFATE 90 UG/1
2 INHALANT RESPIRATORY (INHALATION) EVERY 6 HOURS PRN
Qty: 18 G | Refills: 0 | Status: SHIPPED | OUTPATIENT
Start: 2025-02-04

## 2025-02-04 RX ORDER — BENZONATATE 200 MG/1
200 CAPSULE ORAL 3 TIMES DAILY PRN
Qty: 30 CAPSULE | Refills: 0 | Status: SHIPPED | OUTPATIENT
Start: 2025-02-04 | End: 2025-02-14

## 2025-02-04 ASSESSMENT — PAIN SCALES - GENERAL: PAINLEVEL_OUTOF10: NO PAIN (0)

## 2025-02-04 NOTE — PROGRESS NOTES
"  Assessment & Plan       ICD-10-CM    1. Sinusitis, unspecified chronicity, unspecified location  J32.9 amoxicillin-clavulanate (AUGMENTIN) 875-125 MG tablet      2. Bronchitis  J40 amoxicillin-clavulanate (AUGMENTIN) 875-125 MG tablet     benzonatate (TESSALON) 200 MG capsule     albuterol (PROAIR HFA/PROVENTIL HFA/VENTOLIN HFA) 108 (90 Base) MCG/ACT inhaler      Warning signs discussed. Side effects discussed. Symptomatic treatment such as pushing fluids. Ok for over the counter acetaminophen and /or non-steroidal anti-inflammatory medication as needed.    Recheck 1 wk as needed.     Adore Villela is a 59 year old, presenting for the following health issues:  URI        2/4/2025     9:41 AM   Additional Questions   Roomed by sabas   Accompanied by self         2/4/2025     9:41 AM   Patient Reported Additional Medications   Patient reports taking the following new medications no     History of Present Illness       Reason for visit:  Bad cough  Symptom onset:  1-2 weeks ago  Symptoms include:  Cough  Symptom intensity:  Moderate  Symptom progression:  Improving  Had these symptoms before:  No  What makes it worse:  No  What makes it better:  No   She is taking medications regularly.   2 wks of cough. No wheezing or short of breath. No fevers.   Nasal congestion and headache. No nausea, vomiting, diarrhea  No known exposures.       Review of Systems  Constitutional, HEENT, cardiovascular, pulmonary, gi and gu systems are negative, except as otherwise noted.      Objective    /82   Pulse 88   Temp 97  F (36.1  C) (Tympanic)   Resp 16   Ht 1.702 m (5' 7\")   Wt 70.3 kg (155 lb)   LMP 01/16/2016   SpO2 97%   BMI 24.28 kg/m    Body mass index is 24.28 kg/m .  Physical Exam   GENERAL: healthy, alert and no distress  Head: Normocephalic, atraumatic.  Eyes: Conjunctiva clear, non icteric. PERRLA.  Ears: External ears and TMs normal BL.  Nose:No discharge. Cheek tenderness. PND  Mouth / Throat: Normal " dentition.  No oral lesions. Pharynx non erythematous, tonsils without hypertrophy.  Neck: Supple, no enlarged LN, trachea midline.   RESP: diffuse rhonchi and wheezes   CV: regular rate and rhythm, normal S1 S2, no S3 or S4, no murmur, click or rub, no peripheral edema and peripheral pulses strong  MS: no gross musculoskeletal defects noted, no edema              Signed Electronically by: Onur Ellis PA-C

## 2025-02-26 DIAGNOSIS — J40 BRONCHITIS: ICD-10-CM

## 2025-02-26 RX ORDER — ALBUTEROL SULFATE 90 UG/1
2 INHALANT RESPIRATORY (INHALATION) EVERY 6 HOURS PRN
Qty: 6.7 G | Refills: 3 | Status: SHIPPED | OUTPATIENT
Start: 2025-02-26

## 2025-03-22 ENCOUNTER — ANCILLARY PROCEDURE (OUTPATIENT)
Dept: MAMMOGRAPHY | Facility: CLINIC | Age: 60
End: 2025-03-22
Attending: FAMILY MEDICINE
Payer: COMMERCIAL

## 2025-03-22 DIAGNOSIS — Z12.31 ENCOUNTER FOR SCREENING MAMMOGRAM FOR BREAST CANCER: ICD-10-CM

## 2025-03-22 PROCEDURE — 77063 BREAST TOMOSYNTHESIS BI: CPT | Mod: TC | Performed by: RADIOLOGY

## 2025-03-22 PROCEDURE — 77067 SCR MAMMO BI INCL CAD: CPT | Mod: TC | Performed by: RADIOLOGY

## 2025-04-03 ENCOUNTER — LAB (OUTPATIENT)
Dept: LAB | Facility: CLINIC | Age: 60
End: 2025-04-03
Payer: COMMERCIAL

## 2025-04-03 DIAGNOSIS — E78.5 HYPERLIPIDEMIA LDL GOAL <100: ICD-10-CM

## 2025-04-03 LAB
CHOLEST SERPL-MCNC: 188 MG/DL
FASTING STATUS PATIENT QL REPORTED: YES
HDLC SERPL-MCNC: 43 MG/DL
LDLC SERPL CALC-MCNC: 92 MG/DL
NONHDLC SERPL-MCNC: 145 MG/DL
TRIGL SERPL-MCNC: 263 MG/DL

## 2025-09-03 ENCOUNTER — OFFICE VISIT (OUTPATIENT)
Dept: FAMILY MEDICINE | Facility: CLINIC | Age: 60
End: 2025-09-03
Payer: COMMERCIAL

## 2025-09-03 ENCOUNTER — ANCILLARY PROCEDURE (OUTPATIENT)
Dept: GENERAL RADIOLOGY | Facility: CLINIC | Age: 60
End: 2025-09-03
Attending: FAMILY MEDICINE
Payer: COMMERCIAL

## 2025-09-03 VITALS
BODY MASS INDEX: 27.29 KG/M2 | OXYGEN SATURATION: 99 % | TEMPERATURE: 98.2 F | HEART RATE: 76 BPM | DIASTOLIC BLOOD PRESSURE: 89 MMHG | RESPIRATION RATE: 18 BRPM | WEIGHT: 154 LBS | HEIGHT: 63 IN | SYSTOLIC BLOOD PRESSURE: 134 MMHG

## 2025-09-03 DIAGNOSIS — M79.645 PAIN OF FINGER OF LEFT HAND: ICD-10-CM

## 2025-09-03 DIAGNOSIS — M79.645 PAIN OF FINGER OF LEFT HAND: Primary | ICD-10-CM

## 2025-09-03 PROCEDURE — 99213 OFFICE O/P EST LOW 20 MIN: CPT | Performed by: FAMILY MEDICINE

## 2025-09-03 PROCEDURE — 1125F AMNT PAIN NOTED PAIN PRSNT: CPT | Performed by: FAMILY MEDICINE

## 2025-09-03 PROCEDURE — 3079F DIAST BP 80-89 MM HG: CPT | Performed by: FAMILY MEDICINE

## 2025-09-03 PROCEDURE — 3075F SYST BP GE 130 - 139MM HG: CPT | Performed by: FAMILY MEDICINE

## 2025-09-03 PROCEDURE — G2211 COMPLEX E/M VISIT ADD ON: HCPCS | Performed by: FAMILY MEDICINE

## 2025-09-03 PROCEDURE — 73130 X-RAY EXAM OF HAND: CPT | Mod: TC | Performed by: STUDENT IN AN ORGANIZED HEALTH CARE EDUCATION/TRAINING PROGRAM

## 2025-09-03 RX ORDER — FLUTICASONE PROPIONATE 50 MCG
2 SPRAY, SUSPENSION (ML) NASAL DAILY
COMMUNITY

## 2025-09-03 ASSESSMENT — PAIN SCALES - GENERAL: PAINLEVEL_OUTOF10: MILD PAIN (2)

## 2025-09-04 ENCOUNTER — PATIENT OUTREACH (OUTPATIENT)
Dept: CARE COORDINATION | Facility: CLINIC | Age: 60
End: 2025-09-04
Payer: COMMERCIAL

## (undated) DEVICE — SOL WATER IRRIG 1000ML BOTTLE 07139-09

## (undated) DEVICE — PREP CHLORAPREP 26ML TINTED ORANGE  260815

## (undated) RX ORDER — SIMETHICONE 40MG/0.6ML
SUSPENSION, DROPS(FINAL DOSAGE FORM)(ML) ORAL
Status: DISPENSED
Start: 2017-11-08

## (undated) RX ORDER — FENTANYL CITRATE 50 UG/ML
INJECTION, SOLUTION INTRAMUSCULAR; INTRAVENOUS
Status: DISPENSED
Start: 2017-11-08

## (undated) RX ORDER — FENTANYL CITRATE 50 UG/ML
INJECTION, SOLUTION INTRAMUSCULAR; INTRAVENOUS
Status: DISPENSED
Start: 2022-01-24